# Patient Record
Sex: FEMALE | Race: WHITE
[De-identification: names, ages, dates, MRNs, and addresses within clinical notes are randomized per-mention and may not be internally consistent; named-entity substitution may affect disease eponyms.]

---

## 2019-02-17 NOTE — EMERGENCY ROOM REPORT
History of Present Illness


General


Chief Complaint:  Headache


Source:  Patient, Medical Record





Present Illness


HPI


The patient is a 53-year-old female presenting for headache and dizziness.  She 

states that a bookshelf fell on her head 3 days prior.  She denies loss of 

consciousness at that time but states that the memory is "a little hazy".  She 

denies falling.  Pain is an 8/10 dull ache to the back of the head. Does not 

radiate. Tylenol mildly helps. The bookshelf fell from approximately 2 feet 

above her head.  She denies other symptoms including nausea, vomiting, blurred 

vision, neck pain, photophobia


Allergies:  


Coded Allergies:  


     MEPERIDINE (Verified  Allergy, Intermediate, Hives, 2/25/13)


 COPIED FROM UNCODED SECTION


     PANTOPRAZOLE (Verified  Allergy, Intermediate, 7/16/17)


     MORPHINE (Verified  Allergy, Unknown, ITCHING, 7/16/17)


     TRAMADOL (Verified  Allergy, Unknown, 2/17/19)


     METOCLOPRAMIDE (Verified  Adverse Reaction, Intermediate, "MY MUSCLE 

MUSCLE ARE FREEZING UP", 2/25/13)


 COPIED FROM UNCODED SECTION





Patient History


Past Medical History:  see triage record


Pertinent Family History:  none


Last Menstrual Period:  menopause


Reviewed Nursing Documentation:  PMH: Agreed; PSxH: Agreed





Nursing Documentation-PMH


Past Medical History:  No History, Except For


Hx Cardiac Problems:  No


Hx Hypertension:  No


Hx Asthma:  No


Hx COPD:  No


Hx Diabetes:  No


Hx Cancer:  No


Hx Gastrointestinal Problems:  Yes - ulcer, gastrectomy


Hx Dialysis:  No


Hx Neurological Problems:  No


Hx Cerebrovascular Accident:  No


Hx Transient Ischemic Attacks:  No


Hx Dementia:  No


Hx Alzheimer's Disease:  No


Hx Parkinson's Disease:  No


Hx Meningitis:  No


Hx Encephalitis:  No


Hx Seizures:  No


Hx Epilepsy:  No


Hx Multiple Sclerosis:  No


Hx Cerebral Palsy:  No


Hx Amyotrophic Lat Sclerosis:  No


Hx Guillian-[place] Syndrome:  No


Hx Paralysis:  No


Hx Peripheral Neuropathy:  No


Hx Spinal Cord Injury:  No


Hx Head Trauma:  No


Hx Traumatic Brain Injury:  No


Hx Memory Loss:  No


Hx Concentration Difficulty:  No


Hx Speech Problem:  No


Hx Tremors:  No


Hx Vertigo:  No


Hx Dizziness:  No


Hx Syncope:  No


Hx Headaches:  Yes - POST SUBDURAL HEMATOMA REMOVAL


Hx Aphasia:  No


Hx Dysphasia:  No


Hx Numbness:  No


Hx Weakness:  No


Hx Fatigue:  No


Hx Neurologic Surgery:  Yes - HAD SUBDURAL HEMATOMA REMOVED


Hx Brain Shunt:  No





Review of Systems


All Other Systems:  negative except mentioned in HPI





Physical Exam





Vital Signs








  Date Time  Temp Pulse Resp B/P (MAP) Pulse Ox O2 Delivery O2 Flow Rate FiO2


 


2/17/19 17:50 97.7 87 18 122/77 99 Room Air  








Sp02 EP Interpretation:  reviewed, normal


General Appearance:  no apparent distress, alert, GCS 15, non-toxic


Head:  normocephalic, atraumatic, other - TTP over the R posterior region


Eyes:  bilateral eye normal inspection, bilateral eye PERRL, bilateral eye EOMI


ENT:  hearing grossly normal, normal pharynx, no angioedema, normal voice


Neck:  full range of motion, no bony tend, supple/symm/no masses


Musculoskeletal:  back normal, gait/station normal, normal range of motion, non-

tender


Neurologic:  alert, oriented x3, responsive, motor strength/tone normal, 

sensory intact, speech normal


Psychiatric:  judgement/insight normal, memory normal, mood/affect normal, no 

suicidal/homicidal ideation


Skin:  normal color, no rash, warm/dry, well hydrated





Medical Decision Making


PA Attestation


Dr. Pepe  is my supervising physician. Patient management was discussed with 

my supervising physician


Diagnostic Impression:  


 Primary Impression:  


 Concussion


 Qualified Codes:  S06.0X0A - Concussion without loss of consciousness, initial 

encounter


ER Course


The patient is a 53-year-old female presenting for headache and dizziness.





Differential diagnoses considered but not limited to: Concussion, contusion, 

intracranial hemorrhage, fracture, among others





PE: Vitals stable. NAD


Head is normocephalic atraumatic.  There is tenderness to palpation over the 

right posterior occipital region.  No crepitus or depressions. 


PERRL. EOMI





Pt will be treated symptomatically. She is given information regarding 

concussions and given strict ER precautions. 





She is told to F/U with PCP ASAP.





Last Vital Signs








  Date Time  Temp Pulse Resp B/P (MAP) Pulse Ox O2 Delivery O2 Flow Rate FiO2


 


2/17/19 18:39 98.0 88 15 126/70 100 Room Air  








Status:  improved


Disposition:  HOME, SELF-CARE


Condition:  Improved


Scripts


Ondansetron* (ZOFRAN*) 4 Mg Tablet


4 MG ORAL Q6H PRN for Nausea & Vomiting, #15 TAB


   Prov: NADEEN LOREDO P.A.         2/17/19 


Hydrocodone Bit/Acetaminophen 5-325* (NORCO 5-325*) 1 Each Tablet


1 TAB ORAL Q6H PRN for For Pain, #10 TAB 0 Refills


   Prov: NADEEN LOREDO         2/17/19


Referrals:  


NON PHYSICIAN (PCP)


Patient Instructions:  Head Injury, Adult, Concussion, Adult





Additional Instructions:  


I discussed my findings with the patient. All questions and concerns have been 

answered. Treatment and medication compliance have been addressed. I advised 

the patient that they need to follow up with primary doctor as soon as 

possible. Return to ER if symptoms worsen, new symptoms arise, headache 

persists or worsens after taking medication, or if needed for any reason. 

Patient verbalized understanding of discharge instructions.











NADEEN LOREDO Feb 17, 2019 20:45

## 2019-02-17 NOTE — NUR
ED Nurse Note:



Pt states she already took tylenol 1000mg an hour ago. Bryan notified and DC 
tylenol order.

## 2019-02-17 NOTE — NUR
ED Nurse Note:



Pt was seen due to headache. Pt is cleared by Health Care Provider for 
discharge. DC instructions/prescription was given and explained to pt and 
verbalized understanding of teachings given. All medical devices such as ID 
band removed. Pt AAO x4, ambulatory and left with all personal belongings. Yes
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
## 2019-06-21 NOTE — EMERGENCY ROOM REPORT
History of Present Illness


General


Chief Complaint:  Eye Problems


Source:  Patient, Medical Record





Present Illness


HPI


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  Patient states that yesterday she 

had gotten some dog hair in her right eye.  She states she did irrigate her eye 

at that time.  She states that it was bothering her and then this morning when 

she woke up her eyes irritated and crusty with discharge.  She states that she 

is unable to keep the eye open secondary to discomfort.


Allergies:  


Coded Allergies:  


     MEPERIDINE (Verified  Allergy, Intermediate, Hives, 2/25/13)


 COPIED FROM UNCODED SECTION


     PANTOPRAZOLE (Verified  Allergy, Intermediate, 7/16/17)


     MORPHINE (Verified  Allergy, Unknown, ITCHING, 7/16/17)


     TRAMADOL (Verified  Allergy, Unknown, 2/17/19)


     METOCLOPRAMIDE (Verified  Adverse Reaction, Intermediate, "MY MUSCLE 

MUSCLE ARE FREEZING UP", 2/25/13)


 COPIED FROM UNCODED SECTION





Patient History


Past Medical History:  see triage record, ulcer, GERD


Past Surgical History:  other - Partial gastrectomy, Subdural hematoma


Social History:  Reports: smoking; Denies: alcohol use, drug use


Last Menstrual Period:  menopause


Reviewed Nursing Documentation:  PMH: Agreed; PSxH: Agreed





Nursing Documentation-PMH


Past Medical History:  No History, Except For


Hx Cardiac Problems:  No - SUBDURAL HEMATOMA FEB 2010


Hx Hypertension:  No


Hx Asthma:  No


Hx COPD:  No


Hx Diabetes:  No


Hx Cancer:  No


Hx Gastrointestinal Problems:  Yes - PUD, gastrectomy


Hx Dialysis:  No


Hx Neurological Problems:  No


Hx Cerebrovascular Accident:  No


Hx Transient Ischemic Attacks:  No


Hx Dementia:  No


Hx Alzheimer's Disease:  No


Hx Parkinson's Disease:  No


Hx Meningitis:  No


Hx Encephalitis:  No


Hx Seizures:  No


Hx Epilepsy:  No


Hx Multiple Sclerosis:  No


Hx Cerebral Palsy:  No


Hx Amyotrophic Lat Sclerosis:  No


Hx Guillian-[place] Syndrome:  No


Hx Paralysis:  No


Hx Peripheral Neuropathy:  No


Hx Spinal Cord Injury:  No


Hx Head Trauma:  No


Hx Traumatic Brain Injury:  No


Hx Memory Loss:  No


Hx Concentration Difficulty:  No


Hx Speech Problem:  No


Hx Tremors:  No


Hx Vertigo:  No


Hx Dizziness:  No


Hx Syncope:  No


Hx Headaches:  Yes - POST SUBDURAL HEMATOMA REMOVAL


Hx Aphasia:  No


Hx Dysphasia:  No


Hx Numbness:  No


Hx Weakness:  No


Hx Fatigue:  No


Hx Neurologic Surgery:  Yes - HAD SUBDURAL HEMATOMA REMOVED


Hx Brain Shunt:  No





Review of Systems


All Other Systems:  negative except mentioned in HPI





Physical Exam





Vital Signs








  Date Time  Temp Pulse Resp B/P (MAP) Pulse Ox O2 Delivery O2 Flow Rate FiO2


 


6/21/19 11:35 98.1 76 16 98/60 (73) 97 Room Air  








Sp02 EP Interpretation:  reviewed, normal


General Appearance:  no apparent distress, alert, GCS 15, non-toxic


Head:  normocephalic, atraumatic


Eyes:  right eye other - R. eye with a thin hair in conjunctiva.  No fluorescin 

uptake on woods lamp.  ; bilateral eye PERRL, bilateral eye EOMI


ENT:  hearing grossly normal, normal pharynx, no angioedema, normal voice


Neck:  full range of motion, supple/symm/no masses


Respiratory:  no respiratory distress, no retraction, no accessory muscle use, 

speaking full sentences


Rectal:  deferred


Musculoskeletal:  back normal, gait/station normal, normal range of motion, non-

tender


Neurologic:  alert, oriented x3, responsive, motor strength/tone normal, 

sensory intact, speech normal


Psychiatric:  judgement/insight normal, memory normal, mood/affect normal, no 

suicidal/homicidal ideation


Skin:  normal color, no rash, warm/dry, well hydrated





Medical Decision Making


Diagnostic Impression:  


 Primary Impression:  


 Conjunctivitis


 Additional Impression:  


 Corneal abrasion


ER Course


The patient possibly had a small piece of animal hair in her eye.  There was no 

evidence of corneal abrasion.  The patient was very sensitive to light and 

overall was resistant to opening either eye.  I did not identify any 

significant corneal abrasion.  I did sweep the lids.  The patient was keeping 

her eyelid closed.  I am unsure if this was actually secondary to true eye pain 

or anxiety.  Regardless, the patient was instructed to go to an eye specialist 

right now.  I will give the patient topical antibiotics.  I did impress upon 

her the importance of ophthalmology or optometry evaluation today.  She 

indicated understanding and intention to do so.





Last Vital Signs








  Date Time  Temp Pulse Resp B/P (MAP) Pulse Ox O2 Delivery O2 Flow Rate FiO2


 


6/21/19 11:51 98.1 72 15 108/62 99 Room Air  








Status:  improved


Disposition:  HOME, SELF-CARE


Condition:  Improved


Referrals:  


NOT CHOSEN IPA/MD,REFERRING (PCP)


Patient Instructions:  Bacterial Conjunctivitis, Easy-to-Read











Jennifer Coats DO Jun 21, 2019 12:39

## 2019-06-21 NOTE — NUR
ED Nurse Note:



Patient walked into ED  c/o pain in the right eye. patient works as a 
 and reports that animal hair got into her right eye 
yesterday while working.

patient reports burning pain 8/10. patient reports she flushed with NS really 
well, 

but now it feels like it's "infected."

## 2019-07-13 NOTE — EMERGENCY ROOM REPORT
History of Present Illness


General


Chief Complaint:  Vomiting


Source:  Patient





Present Illness


HPI


53-year-old female past history of gastric ulcer, requiring resection due to 

uncontrolled bleeding, patient patient presents with one episode of hematemesis 

one cupful per patient, she endorses nausea, no chest pain, no shortness of 

breath, she denies any aggravating or alleviating factors, she does endorse 

some achy epigastric pain, no aggravating or alleviating factors patient 

presents for evaluation, she denies any lightheadedness,


Allergies:  


Coded Allergies:  


     MEPERIDINE (Verified  Allergy, Intermediate, Hives, 2/25/13)


 COPIED FROM UNCODED SECTION


     PANTOPRAZOLE (Verified  Allergy, Intermediate, 7/16/17)


     MORPHINE (Verified  Allergy, Unknown, ITCHING, 7/16/17)


     TRAMADOL (Verified  Allergy, Unknown, 2/17/19)


     METOCLOPRAMIDE (Verified  Adverse Reaction, Intermediate, "MY MUSCLE 

MUSCLE ARE FREEZING UP", 2/25/13)


 COPIED FROM UNCODED SECTION





Patient History


Past Medical History:  see triage record


Social History:  Reports: smoking


Reviewed Nursing Documentation:  PMH: Agreed; PSxH: Agreed





Nursing Documentation-PMH


Past Medical History:  No History, Except For


Hx Cardiac Problems:  No - SUBDURAL HEMATOMA FEB 2010


Hx Hypertension:  No


Hx Asthma:  No


Hx COPD:  No


Hx Diabetes:  No


Hx Cancer:  No


Hx Gastrointestinal Problems:  Yes - PUD, gastrectomy


Hx Dialysis:  No


Hx Neurological Problems:  No


Hx Cerebrovascular Accident:  No


Hx Transient Ischemic Attacks:  No


Hx Dementia:  No


Hx Alzheimer's Disease:  No


Hx Parkinson's Disease:  No


Hx Meningitis:  No


Hx Encephalitis:  No


Hx Seizures:  No


Hx Epilepsy:  No


Hx Multiple Sclerosis:  No


Hx Cerebral Palsy:  No


Hx Amyotrophic Lat Sclerosis:  No


Hx Guillian-[place] Syndrome:  No


Hx Paralysis:  No


Hx Peripheral Neuropathy:  No


Hx Spinal Cord Injury:  No


Hx Head Trauma:  No


Hx Traumatic Brain Injury:  No


Hx Memory Loss:  No


Hx Concentration Difficulty:  No


Hx Speech Problem:  No


Hx Tremors:  No


Hx Vertigo:  No


Hx Dizziness:  No


Hx Syncope:  No


Hx Headaches:  Yes - POST SUBDURAL HEMATOMA REMOVAL


Hx Aphasia:  No


Hx Dysphasia:  No


Hx Numbness:  No


Hx Weakness:  No


Hx Fatigue:  No


Hx Neurologic Surgery:  Yes - HAD SUBDURAL HEMATOMA REMOVED


Hx Brain Shunt:  No





Review of Systems


Constitutional:  Denies: chills, fever


Eye:  Denies: blurred vision, double vision


ENT:  Denies: throat pain, nasal discharge


Respiratory:  Denies: cough, shortness of breath


Cardiovascular:  Denies: chest pain, palpitations


Gastrointestinal:  Reports: abdominal pain, nausea, vomiting; Denies: diarrhea


Genitourinary:  Denies: pain


Musculoskeletal:  Denies: back pain, muscle pain


Skin:  Denies: rash, lesions


Neurological:  Denies: headache, focal weakness


Hematologic/Lymphatic:  Denies: easy bleeding, easy bruising


All Other Systems:  negative except mentioned in HPI





Physical Exam





Vital Signs








  Date Time  Temp Pulse Resp B/P (MAP) Pulse Ox O2 Delivery O2 Flow Rate FiO2


 


7/13/19 18:47 98.4 90 18 112/77 (89) 98 Room Air  








Sp02 EP Interpretation:  reviewed, normal


General Appearance:  well appearing, no apparent distress, alert


Head:  normocephalic, atraumatic


Eyes:  bilateral eye PERRL, bilateral eye EOMI


ENT:  uvula midline, moist mucus membranes


Neck:  supple, thyroid normal, supple/symm/no masses


Respiratory:  lungs clear, no respiratory distress, no retraction, no accessory 

muscle use


Cardiovascular #1:  normal peripheral pulses, regular rate, rhythm, no edema, 

no gallop, no murmur


Gastrointestinal:  soft, no guarding, no rebound, tenderness - Tenderness to 

palpation epigastrically, other - Midline scar noted


Musculoskeletal:  normal inspection


Neurologic:  alert, oriented x3


Psychiatric:  mood/affect normal


Skin:  no rash, warm/dry





Medical Decision Making


Diagnostic Impression:  


 Primary Impression:  


 Hematemesis


ER Course


53-year-old female presents with epigastric pain nausea vomiting, hematemesis 

x1 episode, no vomiting in the ER, patient has remained hemodynamic Dayday stable, 

multiple re-evaluations, patient's pain was well controlled with Dilaudid, 

patient was requesting Dilaudid, CT abdomen and pelvis shows no acute findings, 

patient was to be transferred to [facility], Dr. Guerra accepted at 

10:05pm.  Patient was counseled and she stated that she does not want to go to 

[facility] and wants to leave AGAINST MEDICAL ADVICE





DDX: Bleeding ulcer, gastritis, hematemesis





The patient has requested to leave the ED against medical advice. The patient 

reason(s) for leaving include, but are not limited to, the following: does not 

want to be transferred to [facility]. I believe this patient is of sound mind 

and competent to refuse medical care. The patient is responding and asking 

questions appropriately. The patient is oriented to person, place and time. The 

patient is not psychotic, delusional, suicidal, homicidal or hallucinating. The 

patient demonstrates a normal mental capacity to make decisions regarding their 

healthcare. The patient is clinically sober and does not appear to be under the 

influence of any illicit drugs at this time. The patient has been advised of 

the risks, in layman terms, of leaving AMA which include, but are not limited 

to death, coma, permanent disability, loss of current lifestyle, delay in 

diagnosis. Alternatives have been offered - the patient remains steadfast in 

their wish to leave. The patient has been advised that should they change their 

mind they are welcome to return to this hospital, or any other, at any time. 

The patient understands that in no way does an AMA discharge mean that I do not 

want them to have the best medical care available. To this end, I have provided 

appropriate prescriptions, referrals, and discharge instructions. The patient 

did sign AMA paperwork. The above discussion was witnessed by another member of 

staff.





Laboratory Tests








Test


  7/13/19


19:30 7/13/19


20:19


 


White Blood Count


  4.4 K/UL


(4.8-10.8)  L 


 


 


Red Blood Count


  3.90 M/UL


(4.20-5.40)  L 


 


 


Hemoglobin


  11.6 G/DL


(12.0-16.0)  L 


 


 


Hematocrit


  36.3 %


(37.0-47.0)  L 


 


 


Mean Corpuscular Volume 93 FL (80-99)   


 


Mean Corpuscular Hemoglobin


  29.9 PG


(27.0-31.0) 


 


 


Mean Corpuscular Hemoglobin


Concent 32.1 G/DL


(32.0-36.0) 


 


 


Red Cell Distribution Width


  12.2 %


(11.6-14.8) 


 


 


Platelet Count


  166 K/UL


(150-450) 


 


 


Mean Platelet Volume


  7.7 FL


(6.5-10.1) 


 


 


Neutrophils (%) (Auto)


  51.7 %


(45.0-75.0) 


 


 


Lymphocytes (%) (Auto)


  37.5 %


(20.0-45.0) 


 


 


Monocytes (%) (Auto)


  5.2 %


(1.0-10.0) 


 


 


Eosinophils (%) (Auto)


  3.8 %


(0.0-3.0)  H 


 


 


Basophils (%) (Auto)


  1.9 %


(0.0-2.0) 


 


 


Prothrombin Time


  10.2 SEC


(9.30-11.50) 


 


 


Prothrombin Time INR 1.0 (0.9-1.1)   


 


PTT


  25 SEC (23-33)


  


 


 


Sodium Level


  143 MMOL/L


(136-145) 


 


 


Potassium Level


  3.8 MMOL/L


(3.5-5.1) 


 


 


Chloride Level


  105 MMOL/L


() 


 


 


Carbon Dioxide Level


  27 MMOL/L


(21-32) 


 


 


Anion Gap


  11 mmol/L


(5-15) 


 


 


Blood Urea Nitrogen


  18 mg/dL


(7-18) 


 


 


Creatinine


  0.7 MG/DL


(0.55-1.30) 


 


 


Estimate Glomerular


Filtration Rate > 60 mL/min


(>60) 


 


 


Glucose Level


  95 MG/DL


() 


 


 


Calcium Level


  9.4 MG/DL


(8.5-10.1) 


 


 


Total Bilirubin


  0.2 MG/DL


(0.2-1.0) 


 


 


Aspartate Amino Transferase


(AST) 33 U/L (15-37)


  


 


 


Alanine Aminotransferase (ALT)


  32 U/L (12-78)


  


 


 


Alkaline Phosphatase


  75 U/L


() 


 


 


Troponin I


  0.004 ng/mL


(0.000-0.056) 


 


 


Total Protein


  7.4 G/DL


(6.4-8.2) 


 


 


Albumin


  4.7 G/DL


(3.4-5.0) 


 


 


Globulin 2.7 g/dL   


 


Albumin/Globulin Ratio 1.7 (1.0-2.7)   


 


Lipase


  94 U/L


() 


 


 


Urine Color  Yellow  


 


Urine Appearance  Clear  


 


Urine pH  6 (4.5-8.0)  


 


Urine Specific Gravity


  


  1.015


(1.005-1.035)


 


Urine Protein


  


  Negative


(NEGATIVE)


 


Urine Glucose (UA)


  


  Negative


(NEGATIVE)


 


Urine Ketones


  


  Negative


(NEGATIVE)


 


Urine Blood


  


  Negative


(NEGATIVE)


 


Urine Nitrite


  


  Negative


(NEGATIVE)


 


Urine Bilirubin


  


  Negative


(NEGATIVE)


 


Urine Urobilinogen


  


  1 MG/DL


(0.0-1.0)  H


 


Urine Leukocyte Esterase


  


  1+ (NEGATIVE)


H


 


Urine RBC


  


  0 /HPF (0 - 2)


 


 


Urine WBC


  


  0-2 /HPF (0 -


2)


 


Urine Squamous Epithelial


Cells 


  Few /LPF


(NONE/OCC)


 


Urine Bacteria


  


  Few /HPF


(NONE)








Lab Results Impression


H/H stable, unchanged from previous


EKG Diagnostic Results


EKG Time:  19:16


EP Interpretation:  Normal sinus rhythm, rate 81, QTc 47, no acute ST elevations

, normal axis


Rate:  normal


Rhythm:  NSR


ST Segments:  no acute changes





Chest X-Ray Diagnostic Results


Chest X-Ray Diagnostic Results :  


   Chest X-Ray Ordered:  Yes


   # of Views/Limited/Complete:  1 View


   Indication:  Other - Hematemesis


   EP Interpretation:  Yes


   Interpretation:  no consolidation, no effusion, no pneumothorax, no acute 

cardiopulmonary disease


   Impression:  No acute disease





CT/MRI/US Diagnostic Results


CT/MRI/US Diagnostic Results :  


   Imaging Test Ordered:  CT abdomen and pelvis with contrast


   Impression


No acute intra-abdominal processes per radiology, gastric sleeve noted





Last Vital Signs








  Date Time  Temp Pulse Resp B/P (MAP) Pulse Ox O2 Delivery O2 Flow Rate FiO2


 


7/13/19 18:47 98.4 90 18 112/77 (89) 98 Room Air  








Disposition:  AGAINST MEDICAL ADVICE


Condition:  Improved


Scripts


Famotidine (PEPCID AC) 20 Mg Tablet


20 MG PO DAILY, #30 TAB


   Prov: Tono Jolley M.D.         7/13/19


Referrals:  


[facility]


Patient Instructions:  Nausea and Vomiting, Adult





Additional Instructions:  


The patient was provided with discharge instructions, notified to follow-up 

with a primary care doctor and or specialist in the next 24-48 hours, and to 

return to the ED if they have worsening of their symptoms. 








Please follow-up with gastroenterology in 24 to 48 hours,





Please note that this report is being documented using DRAGON technology.


This can lead to erroneous entry secondary to incorrect interpretation by the 

dictating instrument.











Tono Jolley M.D. Jul 13, 2019 19:28

## 2019-07-13 NOTE — NUR
ER DISCHARGE NOTE:

Patient was discharged per LUANA NOBLE aware and spoke with pt in regards to 
risks. pt refuses to transferred to another hospital. pt is aox4, on room air, 
with stable vital signs. pt was given dc and prescription instructions, pt was 
able to verbalize understanding, pt id band and iv site removed without 
complications. pt is able to ambulate with steady gait. pt took all belongings.

## 2019-07-14 NOTE — DIAGNOSTIC IMAGING REPORT
Indication: Abdominal pain

 

Technique: Continuous helical transaxial imaging of the abdomen and pelvis was

obtained from the lung bases to the pubic symphysis during intravenous contrast

administration. Coronal 2-D reformats were also obtained. Study obtained in a Siemens

sensation 64 slice CT.  Automatic Exposure Control was utilized.

 

Total Dose length Product (DLP):  543.98 mGycm

 

CT Dose Index Volume (CTDIvol):   11.9 mGy

 

Comparison: CT 7/16/2017

 

Findings: Lung bases are clear. Signs of the gastric bypass are noted.

Cholecystectomy also demonstrated. The biliary ducts are prominent. Correlate

clinically. Appearance was similar on the prior occasion. Both kidneys enhance

normally. There is a moderate amount of fecal retention in the colon. Bowel gas

pattern appears nonobstructive. There is no significant free fluid. There is no

hydronephrosis. The spleen and pancreas, adrenal glands appear unremarkable. The

uterus is noted. Adnexal structures not seen well.

 

IMPRESSION:

 

No acute findings identified

 

Status post gastrectomy

 

Biliary ductal prominence, stable compared to last study may be related to prior

cholecystectomy. Correlate clinically.

 

[facility] has communicated the preliminary results to the Emergency

Department.  Their findings are largely concordant with this report.

 

 

 

The CT scanner at [facility] is accredited by the American College of

Radiology and the scans are performed using dose optimization techniques as

appropriate to a performed exam including Automatic Exposure control.

## 2019-08-07 NOTE — NUR
ED Nurse Note:

c/o vomiting blood x 2 day (bright red blood with clots '1/2 cup') and c/o LUQ 
abdominal pain since this morning.Reports no use of alcohol or taking any blood 
thinner. ao4 nad vss

## 2019-08-07 NOTE — NUR
ED Nurse Note:

transfer to [facility] cancelled. report given to bridgett barker of [facility] er department. patient to be under the care of md montserrat. pt aware of 
peding transfer.

## 2019-08-07 NOTE — NUR
ED Nurse Note:

REPORT GIVEN TO RACHEL WALKER OF [facility]. PATIENT TO BE ADMITTED 
TO TELE UNDER MD SUNNY.

## 2019-08-07 NOTE — EMERGENCY ROOM REPORT
History of Present Illness


General


Chief Complaint:  Gastrointestinal Bleed


Source:  Patient





Present Illness


HPI


Patient has a history of upper GI bleeding.  Patient has had stomach surgery.  

Patient states that she developed acute onset of vomiting hematemesis today.  

She states that she vomited approximately 2 cupfuls with a blood.  Of note 

patient was here just recently with similar symptoms.  At that time also had a 

CT scan which was negative.  She denies any fever.  Denies any melena.  Denies 

any chest pain shortness of breath.  Symptoms noted to be severe.  Patient 

states the last time she did not want to be transferred to an outside facility 

so she left AGAINST MEDICAL ADVICE but this time she is willing to be treated 

appropriately including transfer.  Symptoms noted to be severe.  No other 

modifying factors.  No other associated signs and symptoms.  No other 

complaints were noted.


Allergies:  


Coded Allergies:  


     MEPERIDINE (Verified  Allergy, Intermediate, Hives, 2/25/13)


 COPIED FROM UNCODED SECTION


     PANTOPRAZOLE (Verified  Allergy, Intermediate, 7/16/17)


     MORPHINE (Verified  Allergy, Unknown, ITCHING, 7/16/17)


     TRAMADOL (Verified  Allergy, Unknown, 2/17/19)


     METOCLOPRAMIDE (Verified  Adverse Reaction, Intermediate, "MY MUSCLE 

MUSCLE ARE FREEZING UP", 2/25/13)


 COPIED FROM UNCODED SECTION





Patient History


Past Medical History:  other - Peptic ulcer disease, gastrectomy


Past Surgical History:  none


Pertinent Family History:  none


Social History:  Denies: smoking, alcohol use, drug use


Last Menstrual Period:  menopause


Reviewed Nursing Documentation:  PMH: Agreed; PSxH: Agreed





Nursing Documentation-PMH


Past Medical History:  No History, Except For


Hx Cardiac Problems:  No - SUBDURAL HEMATOMA FEB 2010


Hx Hypertension:  No


Hx Pacemaker:  No


Hx Asthma:  No


Hx COPD:  No


Hx Diabetes:  No


Hx Cancer:  No


Hx Gastrointestinal Problems:  Yes - PUD, gastrectomy


Hx Dialysis:  No


Hx Neurological Problems:  No


Hx Cerebrovascular Accident:  No


Hx Transient Ischemic Attacks:  No


Hx Dementia:  No


Hx Alzheimer's Disease:  No


Hx Parkinson's Disease:  No


Hx Meningitis:  No


Hx Encephalitis:  No


Hx Seizures:  No


Hx Epilepsy:  No


Hx Multiple Sclerosis:  No


Hx Cerebral Palsy:  No


Hx Amyotrophic Lat Sclerosis:  No


Hx Guillian-[place] Syndrome:  No


Hx Paralysis:  No


Hx Peripheral Neuropathy:  No


Hx Spinal Cord Injury:  No


Hx Head Trauma:  No


Hx Traumatic Brain Injury:  No


Hx Memory Loss:  No


Hx Concentration Difficulty:  No


Hx Speech Problem:  No


Hx Tremors:  No


Hx Vertigo:  No


Hx Dizziness:  No


Hx Syncope:  No


Hx Headaches:  Yes - POST SUBDURAL HEMATOMA REMOVAL


Hx Aphasia:  No


Hx Dysphasia:  No


Hx Numbness:  No


Hx Weakness:  No


Hx Fatigue:  No


Hx Neurologic Surgery:  Yes - HAD SUBDURAL HEMATOMA REMOVED


Hx Brain Shunt:  No





Review of Systems


All Other Systems:  negative except mentioned in HPI





Physical Exam





Vital Signs








  Date Time  Temp Pulse Resp B/P (MAP) Pulse Ox O2 Delivery O2 Flow Rate FiO2


 


8/7/19 18:45 98.4 89 19 116/83 (94) 95 Room Air  








Sp02 EP Interpretation:  reviewed, normal


General Appearance:  normal inspection, well appearing, no apparent distress, 

alert


Head:  atraumatic


Eyes:  bilateral eye normal inspection


ENT:  normal ENT inspection, hearing grossly normal, normal voice


Neck:  normal inspection, full range of motion, supple, no bony tend


Respiratory:  normal inspection, lungs clear, normal breath sounds, no 

respiratory distress, no retraction, no wheezing


Cardiovascular #1:  regular rate, rhythm, no edema


Gastrointestinal:  normal inspection, normal bowel sounds, soft, no guarding, 

no hernia, tenderness - Epigastrium


Genitourinary:  no CVA tenderness


Musculoskeletal:  normal inspection, back normal, normal range of motion


Neurologic:  normal inspection, alert, responsive, speech normal


Psychiatric:  normal inspection, judgement/insight normal, mood/affect normal





Medical Decision Making


Diagnostic Impression:  


 Primary Impression:  


 GIB (gastrointestinal bleeding)


 Additional Impression:  


 Abdominal pain


ER Course


Patient presents emergency department today complaining of abdominal pain.  

Patient has a history of gastric surgery x2 to treat gastric ulcer.  

Unfortunately she continues have abdominal discomfort and she had several 

episodes of GI bleeding in the past.  Today she had 2 episodes she says of 

vomiting hematemesis.  She states that the amount of blood loss probably was a 

couple cups full.  She denies any melena.  Symptoms noted to be severe.  

Differential diagnosis include active GI bleeding, esophageal varices, stomach 

ulcer just name few.  Given the severity of the patient's presentation I felt 

this is a highly complex patient.  This patient required extensive workup.


Patient laboratory work-up was not impressive.  Patient was given pain 

medications to control pain.  Case was discussed with Dr. Calloway.  Patient will 

be transferred to [facility] for further management.





Labs








Test


  8/7/19


19:30


 


White Blood Count


  5.6 K/UL


(4.8-10.8)


 


Red Blood Count


  4.37 M/UL


(4.20-5.40)


 


Hemoglobin


  12.9 G/DL


(12.0-16.0)


 


Hematocrit


  38.4 %


(37.0-47.0)


 


Mean Corpuscular Volume 88 FL (80-99) 


 


Mean Corpuscular Hemoglobin


  29.6 PG


(27.0-31.0)


 


Mean Corpuscular Hemoglobin


Concent 33.7 G/DL


(32.0-36.0)


 


Red Cell Distribution Width


  11.9 %


(11.6-14.8)


 


Platelet Count


  151 K/UL


(150-450)


 


Mean Platelet Volume


  7.2 FL


(6.5-10.1)


 


Neutrophils (%) (Auto)


  42.3 %


(45.0-75.0)


 


Lymphocytes (%) (Auto)


  45.1 %


(20.0-45.0)


 


Monocytes (%) (Auto)


  5.7 %


(1.0-10.0)


 


Eosinophils (%) (Auto)


  6.4 %


(0.0-3.0)


 


Basophils (%) (Auto)


  0.6 %


(0.0-2.0)


 


Prothrombin Time


  10.8 SEC


(9.30-11.50)


 


Prothromb Time International


Ratio 1.0 (0.9-1.1) 


 


 


Activated Partial


Thromboplast Time 27 SEC (23-33) 


 


 


Sodium Level


  140 MMOL/L


(136-145)


 


Potassium Level


  4.6 MMOL/L


(3.5-5.1)


 


Chloride Level


  105 MMOL/L


()


 


Carbon Dioxide Level


  26 MMOL/L


(21-32)


 


Anion Gap


  9 mmol/L


(5-15)


 


Blood Urea Nitrogen


  22 mg/dL


(7-18)


 


Creatinine


  0.6 MG/DL


(0.55-1.30)


 


Estimat Glomerular Filtration


Rate > 60 mL/min


(>60)


 


Glucose Level


  95 MG/DL


()


 


Calcium Level


  9.8 MG/DL


(8.5-10.1)


 


Total Bilirubin


  0.3 MG/DL


(0.2-1.0)


 


Aspartate Amino Transf


(AST/SGOT) 27 U/L (15-37) 


 


 


Alanine Aminotransferase


(ALT/SGPT) 26 U/L (12-78) 


 


 


Alkaline Phosphatase


  75 U/L


()


 


Total Protein


  7.7 G/DL


(6.4-8.2)


 


Albumin


  4.4 G/DL


(3.4-5.0)


 


Globulin 3.3 g/dL 


 


Albumin/Globulin Ratio 1.3 (1.0-2.7) 


 


Lipase


  94 U/L


()











Last Vital Signs








  Date Time  Temp Pulse Resp B/P (MAP) Pulse Ox O2 Delivery O2 Flow Rate FiO2


 


8/7/19 18:45 98.4 89 19 116/83 (94) 95 Room Air  








Status:  improved


Disposition:  XFER [facility]


Condition:  Serious











Rodrigo Infante MD Aug 7, 2019 19:00

## 2019-08-07 NOTE — NUR
ED Nurse Note:

REPORT GIVEN TO RACHEL YUSUF OF [facility]. PT LEFT WITH EMS WITH ALL 
BELONGINGS. STABLE FOR TRANSFER.

## 2019-08-22 NOTE — NUR
ED Nurse Note:

pt walked in c/o epigastric pain and blood in vomiting x 3 hrs.

-------------------------------------------------------------------------------

Addendum: 08/22/19 at 1940 by SERVANDO

-------------------------------------------------------------------------------

ED Nurse Note:

pt walked in c/o epigastric pain and blood in vomiting x 3 hrs. Pt is AAO X 4, 
VSS, with no acute distress.

## 2019-08-22 NOTE — EMERGENCY ROOM REPORT
History of Present Illness


General


Chief Complaint:  Abdominal Pain


Source:  Patient





Present Illness


HPI


53-year-old female past history of gastric ulcer, requiring resection due to 

uncontrolled bleeding, patient patient presents with one episode of hematemesis 

prior to arrival, one cupful, no aggravating or relieving factors, patient 

denies any lightheadedness no chest pain she does endorse some epigastric pain 

burning in nature severity is mild.  patient was seen at [facility] 8/8/ 2019 with a negative EGD.  Denies any chest pain shortness of breath.  Patient 

presents for evaluation


Allergies:  


Coded Allergies:  


     MEPERIDINE (Verified  Allergy, Intermediate, Hives, 2/25/13)


 COPIED FROM UNCODED SECTION


     PANTOPRAZOLE (Verified  Allergy, Intermediate, 7/16/17)


     MORPHINE (Verified  Allergy, Unknown, ITCHING, 7/16/17)


     TRAMADOL (Verified  Allergy, Unknown, 2/17/19)


     METOCLOPRAMIDE (Verified  Adverse Reaction, Intermediate, "MY MUSCLE 

MUSCLE ARE FREEZING UP", 2/25/13)


 COPIED FROM UNCODED SECTION





Patient History


Past Medical History:  see triage record


Pregnant Now:  No


Reviewed Nursing Documentation:  PMH: Agreed; PSxH: Agreed





Nursing Documentation-PMH


Past Medical History:  No History, Except For


Hx Cardiac Problems:  No - SUBDURAL HEMATOMA FEB 2010


Hx Hypertension:  No


Hx Pacemaker:  No


Hx Asthma:  No


Hx COPD:  No


Hx Diabetes:  No


Hx Cancer:  No


Hx Gastrointestinal Problems:  Yes - PUD, gastrectomy


Hx Dialysis:  No


Hx Neurological Problems:  No


Hx Cerebrovascular Accident:  No


Hx Transient Ischemic Attacks:  No


Hx Dementia:  No


Hx Alzheimer's Disease:  No


Hx Parkinson's Disease:  No


Hx Meningitis:  No


Hx Encephalitis:  No


Hx Seizures:  No


Hx Epilepsy:  No


Hx Multiple Sclerosis:  No


Hx Cerebral Palsy:  No


Hx Amyotrophic Lat Sclerosis:  No


Hx Guillian-Manderson Syndrome:  No


Hx Paralysis:  No


Hx Peripheral Neuropathy:  No


Hx Spinal Cord Injury:  No


Hx Head Trauma:  No


Hx Traumatic Brain Injury:  No


Hx Memory Loss:  No


Hx Concentration Difficulty:  No


Hx Speech Problem:  No


Hx Tremors:  No


Hx Vertigo:  No


Hx Dizziness:  No


Hx Syncope:  No


Hx Headaches:  Yes - POST SUBDURAL HEMATOMA REMOVAL


Hx Aphasia:  No


Hx Dysphasia:  No


Hx Numbness:  No


Hx Weakness:  No


Hx Fatigue:  No


Hx Neurologic Surgery:  Yes - HAD SUBDURAL HEMATOMA REMOVED


Hx Brain Shunt:  No





Review of Systems


All Other Systems:  negative except mentioned in HPI





Physical Exam





Vital Signs








  Date Time  Temp Pulse Resp B/P (MAP) Pulse Ox O2 Delivery O2 Flow Rate FiO2


 


8/22/19 19:26 97.7 103 18 112/75 (87) 98 Room Air  








Sp02 EP Interpretation:  reviewed, normal


General Appearance:  well appearing, no apparent distress, alert


Head:  normocephalic, atraumatic


Eyes:  bilateral eye PERRL, bilateral eye EOMI


ENT:  uvula midline, moist mucus membranes


Neck:  supple, thyroid normal, supple/symm/no masses


Respiratory:  lungs clear, no respiratory distress, no retraction, no accessory 

muscle use


Cardiovascular #1:  normal peripheral pulses, regular rate, rhythm, no edema, 

no gallop, no murmur


Gastrointestinal:  non tender, soft, no guarding, no rebound


Musculoskeletal:  normal inspection


Neurologic:  alert, oriented x3


Psychiatric:  mood/affect normal


Skin:  no rash, warm/dry





Medical Decision Making


Diagnostic Impression:  


 Primary Impression:  


 Gastritis


 Qualified Codes:  K29.51 - Unspecified chronic gastritis with bleeding


ER Course


53-year-old female history of gastric resection, history of GI bleeds, history 

of negative EGDs 2017, 2019 8/8/2019, patient has a history of asking for 

Dilaudid, patient in no acute distress, will provide patient with Pepcid, will 

evaluate hemoglobin, if there is no change in hemoglobin from her previous 

visit low suspicion for acute GI bleed.  She is in no acute distress


Reevaluation 8:16 PM, patient noted to be on her cell phone playing games, she 

told the nurse that she is allergic to morphine and requires stronger 

medication 


Spoke with Dr. Dileep Andrea 8:49pm it was found to have syringes of blood on her 

person, she was admitted 12 times to [facility] there is a concern for possible 

fictitious disorder


Reevaluation at 8:53 PM, patient wants to leave AGAINST MEDICAL ADVICE


H/H improved from [place] visit now 11.9 versus 10. 


Low suspicion for acute bleed. 


The patient has requested to leave the ED against medical advice. The patient 

reason(s) for leaving include, but are not limited to, the following: "my 

boyfriend wants me to leave". I believe this patient is of sound mind and 

competent to refuse medical care. The patient is responding and asking 

questions appropriately. The patient is oriented to person, place and time. The 

patient is not psychotic, delusional, suicidal, homicidal or hallucinating. The 

patient demonstrates a normal mental capacity to make decisions regarding their 

healthcare. The patient is clinically sober and does not appear to be under the 

influence of any illicit drugs at this time. The patient has been advised of 

the risks, in layman terms, of leaving AMA which include, but are not limited 

to death, coma, permanent disability, loss of current lifestyle, delay in 

diagnosis. Alternatives have been offered - the patient remains steadfast in 

their wish to leave. The patient has been advised that should they change their 

mind they are welcome to return to this hospital, or any other, at any time. 

The patient understands that in no way does an AMA discharge mean that I do not 

want them to have the best medical care available. To this end, I have provided 

appropriate prescriptions, referrals, and discharge instructions. The patient 

did sign AMA paperwork. The above discussion was witnessed by another member of 

staff.





Laboratory Tests








Test


  8/22/19


20:05 8/22/19


20:15


 


White Blood Count


  5.2 K/UL


(4.8-10.8) 


 


 


Red Blood Count


  4.07 M/UL


(4.20-5.40)  L 


 


 


Hemoglobin


  11.9 G/DL


(12.0-16.0)  L 


 


 


Hematocrit


  35.5 %


(37.0-47.0)  L 


 


 


Mean Corpuscular Volume 87 FL (80-99)   


 


Mean Corpuscular Hemoglobin


  29.3 PG


(27.0-31.0) 


 


 


Mean Corpuscular Hemoglobin


Concent 33.7 G/DL


(32.0-36.0) 


 


 


Red Cell Distribution Width


  12.4 %


(11.6-14.8) 


 


 


Platelet Count


  202 K/UL


(150-450) 


 


 


Mean Platelet Volume


  6.9 FL


(6.5-10.1) 


 


 


Neutrophils (%) (Auto)


  50.9 %


(45.0-75.0) 


 


 


Lymphocytes (%) (Auto)


  31.3 %


(20.0-45.0) 


 


 


Monocytes (%) (Auto)


  6.8 %


(1.0-10.0) 


 


 


Eosinophils (%) (Auto)


  9.2 %


(0.0-3.0)  H 


 


 


Basophils (%) (Auto)


  1.8 %


(0.0-2.0) 


 


 


Prothrombin Time Pending   


 


Prothrombin Time INR Pending   


 


PTT Pending   


 


Sodium Level


  142 MMOL/L


(136-145) 


 


 


Potassium Level


  3.6 MMOL/L


(3.5-5.1) 


 


 


Chloride Level


  107 MMOL/L


() 


 


 


Carbon Dioxide Level


  26 MMOL/L


(21-32) 


 


 


Anion Gap


  9 mmol/L


(5-15) 


 


 


Blood Urea Nitrogen


  15 mg/dL


(7-18) 


 


 


Creatinine


  0.8 MG/DL


(0.55-1.30) 


 


 


Estimate Glomerular


Filtration Rate > 60 mL/min


(>60) 


 


 


Glucose Level


  116 MG/DL


()  H 


 


 


Calcium Level


  9.2 MG/DL


(8.5-10.1) 


 


 


Total Bilirubin


  0.3 MG/DL


(0.2-1.0) 


 


 


Aspartate Amino Transferase


(AST) 28 U/L (15-37)


  


 


 


Alanine Aminotransferase (ALT)


  30 U/L (12-78)


  


 


 


Alkaline Phosphatase


  80 U/L


() 


 


 


Total Protein


  7.4 G/DL


(6.4-8.2) 


 


 


Albumin


  4.0 G/DL


(3.4-5.0) 


 


 


Globulin 3.4 g/dL   


 


Albumin/Globulin Ratio 1.2 (1.0-2.7)   


 


Lipase


  118 U/L


() 


 


 


Urine Color  Yellow  


 


Urine Appearance


  


  Slightly


cloudy


 


Urine pH  5 (4.5-8.0)  


 


Urine Specific Gravity


  


  1.025


(1.005-1.035)


 


Urine Protein


  


  Negative


(NEGATIVE)


 


Urine Glucose (UA)


  


  2+ (NEGATIVE)


H


 


Urine Ketones


  


  Negative


(NEGATIVE)


 


Urine Blood


  


  Negative


(NEGATIVE)


 


Urine Nitrite


  


  Negative


(NEGATIVE)


 


Urine Bilirubin


  


  Negative


(NEGATIVE)


 


Urine Urobilinogen


  


  4 MG/DL


(0.0-1.0)  H


 


Urine Leukocyte Esterase


  


  1+ (NEGATIVE)


H


 


Urine RBC  Pending  


 


Urine WBC  Pending  


 


Urine Squamous Epithelial


Cells 


  Pending  


 


 


Urine Bacteria  Pending  











Last Vital Signs








  Date Time  Temp Pulse Resp B/P (MAP) Pulse Ox O2 Delivery O2 Flow Rate FiO2


 


8/22/19 19:26 97.7 103 18 112/75 (87) 98 Room Air  








Disposition:  AGAINST MEDICAL ADVICE


Condition:  Stable


Referrals:  


GASTROENTEROLOGY


Patient Instructions:  Gastrointestinal Bleeding, Easy-to-Read





Additional Instructions:  


The patient was provided with discharge instructions, notified to follow-up 

with a primary care doctor and or specialist in the next 24-48 hours, and to 

return to the ED if they have worsening of their symptoms. 





Please note that this report is being documented using DRAGON technology.


This can lead to erroneous entry secondary to incorrect interpretation by the 

dictating instrument.











Tono Jolley MD Aug 22, 2019 19:59

## 2019-08-23 NOTE — DIAGNOSTIC IMAGING REPORT
Indication: Shortness of breath

 

Technique: One view of the chest

 

Comparison: 7/13/2019

 

Findings: Lungs and pleural spaces are clear. Heart size is normal. There is no

significant interim change

 

Impression: No acute process

## 2019-11-02 NOTE — NUR
ED Nurse Note:



Patient states,  she realised that she does not have [employer]i[facility], and wants to 
leave. ER MD notifyed, walked in to pt's room in [facility] to talk to her.

## 2019-11-02 NOTE — EMERGENCY ROOM REPORT
History of Present Illness


General


Chief Complaint:  Abdominal Pain


Source:  Patient, Medical Record





Present Illness


HPI


The patient presents with epigastric pain and vomiting.  She has a history of 

gastritis.  Apparently she had endoscopy August 8 at [place] which is reported 

"negative".  Today she was vomiting some blood.  She denies any melena at this 

time or hematochezia.  She rates the pain 8/10 at this time burning and aching 

epigastric nonradiating.  She was previously using Zofran which did not work 

well for her.  She also has Pepcid and Carafate.





History of partial gastrectomy.





The patient was seen here August 22.  She ended up signing out AGAINST MEDICAL 

ADVICE on that visit.  Please review the notes from Dr. Jolley who made 

several calls to try and ascertain the patient's history and past history.  No 

addition she was found to have vitals of blood in her purse.





In July she had a similar presentation.  At that time she refused to be 

transferred to [facility].  She signed out AGAINST MEDICAL ADVICE.





The patient was admitted in July 2017 with a GI bleed.  Discharge diagnoses:


1. Abdominal pain with possible gastrointestinal bleed.


2. Status post esophagogastroduodenoscopy with findings of small hiatal


hernia, gastric polyps, partial antrectomy, and clipping the jejunum


without any active bleed.





No fevers, chills, sore throat, chest pain, palpitations, dysuria, shortness of 

breath, joint pain, rashes, visual changes, dizziness, headache.





She was seen twice earlier this year for concussions.  She has a history of 

subdural hematoma which was removed in 2010.  In June when she was seen she had 

some dysdiadochokinesis.  She was discharged home.


Allergies:  


Coded Allergies:  


     MEPERIDINE (Verified  Allergy, Intermediate, Hives, 2/25/13)


 COPIED FROM UNCODED SECTION


     PANTOPRAZOLE (Verified  Allergy, Intermediate, 7/16/17)


     MORPHINE (Verified  Allergy, Unknown, ITCHING, 7/16/17)


     TRAMADOL (Verified  Allergy, Unknown, 2/17/19)


     METOCLOPRAMIDE (Verified  Adverse Reaction, Intermediate, "MY MUSCLE 

MUSCLE ARE FREEZING UP", 2/25/13)


 COPIED FROM UNCODED SECTION





Patient History


Past Medical History:  see triage record, old chart reviewed, other - Gastritis


Past Surgical History:  other - Subdural hematoma 2010, partial gastrectomy


Social History:  Reports: smoking; Denies: alcohol use, drug use


Social History Narrative


Veterinary tech


Last Menstrual Period:  N/A since 2016


Pregnant Now:  No


Reviewed Nursing Documentation:  PMH: Agreed; PSxH: Agreed





Nursing Documentation-PMH


Hx Cardiac Problems:  No - SUBDURAL HEMATOMA FEB 2010


Hx Hypertension:  No


Hx Pacemaker:  No


Hx Asthma:  No


Hx COPD:  No


Hx Diabetes:  No


Hx Cancer:  No


Hx Gastrointestinal Problems:  Yes - PUD, gastrectomy


Hx Dialysis:  No


Hx Neurological Problems:  No


Hx Cerebrovascular Accident:  No


Hx Transient Ischemic Attacks:  No


Hx Dementia:  No


Hx Alzheimer's Disease:  No


Hx Parkinson's Disease:  No


Hx Meningitis:  No


Hx Encephalitis:  No


Hx Seizures:  No


Hx Epilepsy:  No


Hx Multiple Sclerosis:  No


Hx Cerebral Palsy:  No


Hx Amyotrophic Lat Sclerosis:  No


Hx Guillian-Stockton Syndrome:  No


Hx Paralysis:  No


Hx Peripheral Neuropathy:  No


Hx Spinal Cord Injury:  No


Hx Head Trauma:  No


Hx Traumatic Brain Injury:  No


Hx Memory Loss:  No


Hx Concentration Difficulty:  No


Hx Speech Problem:  No


Hx Tremors:  No


Hx Vertigo:  No


Hx Dizziness:  No


Hx Syncope:  No


Hx Headaches:  Yes - POST SUBDURAL HEMATOMA REMOVAL


Hx Aphasia:  No


Hx Dysphasia:  No


Hx Numbness:  No


Hx Weakness:  No


Hx Fatigue:  No


Hx Neurologic Surgery:  Yes - HAD SUBDURAL HEMATOMA REMOVED


Hx Brain Shunt:  No





Review of Systems


All Other Systems:  negative except mentioned in HPI





Physical Exam





Vital Signs








  Date Time  Temp Pulse Resp B/P (MAP) Pulse Ox O2 Delivery O2 Flow Rate FiO2


 


11/2/19 17:47 98.4 77 18 125/86 (99) 96 Room Air  








Sp02 EP Interpretation:  reviewed, normal


General Appearance:  well appearing, no apparent distress, GCS 15, thin


Head:  normocephalic


Eyes:  bilateral eye normal inspection, bilateral eye PERRL, bilateral eye EOMI


ENT:  moist mucus membranes


Neck:  supple


Respiratory:  lungs clear, normal breath sounds


Cardiovascular #1:  regular rate, rhythm


Cardiovascular #2:  2+ radial (R)


Gastrointestinal:  normal inspection, normal bowel sounds, no mass, non-

distended, no guarding, no rebound, tenderness - Epigastric, scaphoid


Genitourinary:  no CVA tenderness


Musculoskeletal:  back normal, gait/station normal, normal range of motion


Neurologic:  alert, oriented x3, grossly normal


Psychiatric:  depressed affect


Skin:  no rash, warm/dry





Medical Decision Making


Diagnostic Impression:  


 Primary Impression:  


 Gastritis


 Qualified Codes:  K29.00 - Acute gastritis without bleeding


ER Course


Patient presents with vomiting and alleged blood with a history of gastritis.  

Differential includes gastritis, Cinda-Kate tear, pancreatitis, peptic ulcer 

disease, viral illness amongst others.  Patient evaluated with EKG, chest x-ray 

and labs..  Patient treated with Pepcid and Zofran.  Very complicated patient.





EKG without injury.  Chest x-ray no infiltrates.  CBC and CMP unremarkable.  

Lipase normal.





Patient threatens to sign out (in order to smoke).  Patient did not give urine 

sample.  She states she has no insurance at this time and this worries her.





When returned, requested more Zofran.





Given percocet.  Tolerating oral intake.  Still some gagging at times.





Patient again insisting on leaving.  





Discussed treatment plan with patient.  Also the need for close outpatient 

follow-up.  She was advised to return if she is not doing well.





Laboratory Tests








Test


  11/2/19


19:05 11/2/19


19:40


 


White Blood Count


  4.3 K/UL


(4.8-10.8)  L 


 


 


Red Blood Count


  3.90 M/UL


(4.20-5.40)  L 


 


 


Hemoglobin


  11.2 G/DL


(12.0-16.0)  L 


 


 


Hematocrit


  33.9 %


(37.0-47.0)  L 


 


 


Mean Corpuscular Volume 87 FL (80-99)   


 


Mean Corpuscular Hemoglobin


  28.7 PG


(27.0-31.0) 


 


 


Mean Corpuscular Hemoglobin


Concent 33.0 G/DL


(32.0-36.0) 


 


 


Red Cell Distribution Width


  12.8 %


(11.6-14.8) 


 


 


Platelet Count


  156 K/UL


(150-450) 


 


 


Mean Platelet Volume


  6.5 FL


(6.5-10.1) 


 


 


Neutrophils (%) (Auto)


  54.7 %


(45.0-75.0) 


 


 


Lymphocytes (%) (Auto)


  36.1 %


(20.0-45.0) 


 


 


Monocytes (%) (Auto)


  5.1 %


(1.0-10.0) 


 


 


Eosinophils (%) (Auto)


  2.7 %


(0.0-3.0) 


 


 


Basophils (%) (Auto)


  1.4 %


(0.0-2.0) 


 


 


Sodium Level


  144 MMOL/L


(136-145) 


 


 


Potassium Level


  4.1 MMOL/L


(3.5-5.1) 


 


 


Chloride Level


  108 MMOL/L


()  H 


 


 


Carbon Dioxide Level


  29 MMOL/L


(21-32) 


 


 


Anion Gap


  7 mmol/L


(5-15) 


 


 


Blood Urea Nitrogen


  14 mg/dL


(7-18) 


 


 


Creatinine


  0.5 MG/DL


(0.55-1.30)  L 


 


 


Estimate Glomerular


Filtration Rate > 60 mL/min


(>60) 


 


 


Glucose Level


  92 MG/DL


() 


 


 


Calcium Level


  9.5 MG/DL


(8.5-10.1) 


 


 


Magnesium Level


  2.4 MG/DL


(1.8-2.4) 


 


 


Total Bilirubin


  0.3 MG/DL


(0.2-1.0) 


 


 


Aspartate Amino Transferase


(AST) 38 U/L (15-37)


H 


 


 


Alanine Aminotransferase (ALT)


  42 U/L (12-78)


  


 


 


Alkaline Phosphatase


  60 U/L


() 


 


 


Troponin I


  0.000 ng/mL


(0.000-0.056) 


 


 


Total Protein


  7.5 G/DL


(6.4-8.2) 


 


 


Albumin


  4.6 G/DL


(3.4-5.0) 


 


 


Globulin 2.9 g/dL   


 


Albumin/Globulin Ratio 1.6 (1.0-2.7)   


 


Lipase


  109 U/L


() 


 


 


Serum Alcohol < 3 mg/dL   


 


Prothrombin Time


  


  11.0 SEC


(9.30-11.50)


 


Prothrombin Time INR  1.0 (0.9-1.1)  


 


PTT


  


  25 SEC (23-33)


 








EKG Diagnostic Results


Rate:  normal


Rhythm:  NSR


ST Segments:  no acute changes - Nonspecific ST-T wave changes





Rhythm Strip Diag. Results


Rhythm:  NSR, no PVC's, no ectopy





Chest X-Ray Diagnostic Results


Chest X-Ray Diagnostic Results :  


   Chest X-Ray Ordered:  Yes


   # of Views/Limited/Complete:  1 View


   Indication:  Other


   EP Interpretation:  Yes


   Interpretation:  no consolidation, no effusion, no pneumothorax, other - copd


   Impression:  Other





Last Vital Signs








  Date Time  Temp Pulse Resp B/P (MAP) Pulse Ox O2 Delivery O2 Flow Rate FiO2


 


11/2/19 22:10 98.4 86 18 110/82 96 Room Air  








Status:  improved


Disposition:  HOME, SELF-CARE


Condition:  Improved


Scripts


Hydrocodone Bit/Acetaminophen 5-325* (NORCO 5-325*) 1 Each Tablet


1 TAB ORAL Q6H PRN for For Pain, #6 TAB 0 Refills


   Prov: Handy Pepe MD         11/2/19 


Promethazine HCl (Promethegan) 25 Mg Supp.rect


25 MG RECTAL Q8HR PRN for Nausea & Vomiting, #6 SUPP 2 Refills


   Prov: Handy Pepe MD         11/2/19 


Promethazine Hcl* (PHENERGAN*) 25 Mg Tablet


25 MG ORAL Q8HR, #15 TAB 0 Refills


   Prov: Handy Pepe MD         11/2/19











Handy Pepe MD Nov 2, 2019 18:16

## 2019-11-02 NOTE — NUR
ED Nurse Note:

IV started by RACHEL nolen on patients left hand 22 gauge, informed Dr. hunt 
about the delay of blood collection. Contacted shabbir  for draw. will 
continue to monitor

## 2019-11-02 NOTE — NUR
ED Nurse Note:

Patient walked into ED c/o upper abodminal pain that has been an on going issue 
for the past 3 days accomapnied by N/V/D, patient is complaining of 8/10 pain, 
multiple nurses have attempted to start an IV however no success. patient is 
alert and oriented x4, ambulatory with a steady gait, VSS. will wait for 
further orders

## 2019-11-03 NOTE — DIAGNOSTIC IMAGING REPORT
Indication: Reason For Exam: ABD PAIN

 

Technique:  Single AP view of the chest.

 

Comparison: Chest radiograph dated 8/22/2019

 

Findings:

 

The cardiomediastinal silhouette is within normal limits when accounting for

rotation. Obscuration of the right heart border is likely due to rotation at the

posterior airspace consolidation. No pneumothorax or pleural effusion. Osseous

structures demonstrate no acute abnormality.

 

IMPRESSION:

 

1.  Obscuration of the right heart border, which is likely due to rotation of the

posterior airspace consolidation.

2.  Otherwise, no radiographic evidence of acute cardiopulmonary process.

## 2019-11-05 NOTE — CARDIOLOGY REPORT
--------------- APPROVED REPORT --------------





EKG Measurement

Heart [id number]

VA [id number]

[id number]

[id number]

[id number]





Normal sinus rhythm

Cannot rule out Anteroseptal infarct, age undetermined

Abnormal ECG

## 2019-12-16 NOTE — EMERGENCY ROOM REPORT
History of Present Illness


General


Chief Complaint:  Gastrointestinal Bleed


Source:  Patient





Present Illness


HPI


Patient presents with complaints of epigastric pain and vomiting blood she 

reports that this started this morning





She has a burning sensation as well in the epigastric area denies any diarrhea 

or lower abdominal pain denies any back or flank pain





Patient reports previous GI bleed in the past


Reports that she has not been able to follow-up with primary physician since 

her last visits to the emergency room here


Allergies:  


Coded Allergies:  


     MEPERIDINE (Verified  Allergy, Intermediate, Hives, 2/25/13)


 COPIED FROM UNCODED SECTION


     PANTOPRAZOLE (Verified  Allergy, Intermediate, 7/16/17)


     MORPHINE (Verified  Allergy, Unknown, ITCHING, 7/16/17)


     TRAMADOL (Verified  Allergy, Unknown, 2/17/19)


     METOCLOPRAMIDE (Verified  Adverse Reaction, Intermediate, "MY MUSCLE 

MUSCLE ARE FREEZING UP", 2/25/13)


 COPIED FROM UNCODED SECTION





Patient History


Past Medical History:  see triage record


Last Menstrual Period:  na


Reviewed Nursing Documentation:  PMH: Agreed; PSxH: Agreed





Nursing Documentation-PMH


Past Medical History:  No History, Except For


Hx Cardiac Problems:  No - SUBDURAL HEMATOMA FEB 2010


Hx Hypertension:  No


Hx Pacemaker:  No


Hx Asthma:  No


Hx COPD:  No


Hx Diabetes:  No


Hx Cancer:  No


Hx Gastrointestinal Problems:  Yes - PUD, gastrectomy


Hx Dialysis:  No


Hx Neurological Problems:  No


Hx Cerebrovascular Accident:  No


Hx Transient Ischemic Attacks:  No


Hx Dementia:  No


Hx Alzheimer's Disease:  No


Hx Parkinson's Disease:  No


Hx Meningitis:  No


Hx Encephalitis:  No


Hx Seizures:  No


Hx Epilepsy:  No


Hx Multiple Sclerosis:  No


Hx Cerebral Palsy:  No


Hx Amyotrophic Lat Sclerosis:  No


Hx Guillian-Montezuma Syndrome:  No


Hx Paralysis:  No


Hx Peripheral Neuropathy:  No


Hx Spinal Cord Injury:  No


Hx Head Trauma:  No


Hx Traumatic Brain Injury:  No


Hx Memory Loss:  No


Hx Concentration Difficulty:  No


Hx Speech Problem:  No


Hx Tremors:  No


Hx Vertigo:  No


Hx Dizziness:  No


Hx Syncope:  No


Hx Headaches:  Yes - POST SUBDURAL HEMATOMA REMOVAL


Hx Aphasia:  No


Hx Dysphasia:  No


Hx Numbness:  No


Hx Weakness:  No


Hx Fatigue:  No


Hx Neurologic Surgery:  Yes - HAD SUBDURAL HEMATOMA REMOVED


Hx Brain Shunt:  No





Review of Systems


All Other Systems:  negative except mentioned in HPI





Physical Exam





Vital Signs








  Date Time  Temp Pulse Resp B/P (MAP) Pulse Ox O2 Delivery O2 Flow Rate FiO2


 


12/16/19 16:15 97.5 77 20 104/69 (81) 98 Room Air  








Sp02 EP Interpretation:  reviewed, normal


General Appearance:  well appearing, no apparent distress


Head:  normocephalic, atraumatic


Eyes:  bilateral eye PERRL, bilateral eye EOMI


ENT:  hearing grossly normal, normal pharynx, TMs + canals normal, uvula midline


Neck:  full range of motion, supple, no meningismus, no bony tend


Respiratory:  lungs clear, normal breath sounds, no rhonchi, no respiratory 

distress, no retraction, no accessory muscle use


Cardiovascular #1:  normal peripheral pulses, regular rate, rhythm, no edema, 

no gallop, no JVD, no murmur


Gastrointestinal:  normal bowel sounds, non tender, soft, no mass, no 

organomegaly, non-distended, no guarding, no hernia, no pulsatile mass, no 

rebound


Genitourinary:  no CVA tenderness


Musculoskeletal:  normal inspection


Neurologic:  motor strength/tone normal, CNs III-XII nml as tested, oriented x3

, sensory intact, responsive


Psychiatric:  mood/affect normal


Lymphatic:  normal inspection, no adenopathy





Medical Decision Making


Diagnostic Impression:  


 Primary Impression:  


 Vomiting


ER Course


Multiple differentials including but not limited to upper GI bleed, gastritis, 

cholecystitis entertained


Patient was pending obtaining a CBC she was provided with antiemetic medication





At this time however patient reports that she did not want to wait further for 

her testing


And wanted to go home to rest denies any further vomiting while she was here


However given that we do not have patient's CBC at this time patient did leave 

AGAINST MEDICAL ADVICE


Rhythm Strip Diag. Results


EP Interpretation:  yes


Rate:  70


Rhythm:  NSR, no PVC's, no ectopy





Last Vital Signs








  Date Time  Temp Pulse Resp B/P (MAP) Pulse Ox O2 Delivery O2 Flow Rate FiO2


 


12/16/19 16:15 97.5 77 20 104/69 (81) 98 Room Air  








Status:  improved


Disposition:  AGAINST MEDICAL ADVICE


Condition:  Serious





Additional Instructions:  


Please return if you change your mind and want to be followed further











Pilar Ruggiero DO Dec 16, 2019 16:36

## 2019-12-21 NOTE — NUR
ED Nurse Note:



Patient transported on gurney to telemetry unit on cardiac monitor with 1 RN 
and ER tech in stable condition.

## 2019-12-21 NOTE — NUR
ED Nurse Note:



Per charge nurse, VRE and MRSA swab only done if patient was admitted into 
inpatient in last visit.

## 2019-12-21 NOTE — DIAGNOSTIC IMAGING REPORT
History: ABD PAIN

 

Exam: XR CXR 1 VIEW

 

Comparison: 11/2/2019

 

FINDINGS:

 

The lungs are clear.  The cardiac and mediastinal contours are within 

limits.  Surgical clips at the visualized upper abdomen again noted.  

Visualized osseous structures appear within limits.

 

IMPRESSION:

 

No evidence of acute disease.

## 2019-12-21 NOTE — EMERGENCY ROOM REPORT
History of Present Illness


General


Chief Complaint:  Gastrointestinal Bleed


Source:  Patient, Medical Record


 (Vilma Kuhn)





Present Illness


HPI


54-year-old female presents to the emergency department complaining of 8 out of 

10 severity of her epigastric abdominal pain in addition to vomiting blood.  

Patient reports she began vomiting dark blood clots approximately 1 hour prior 

to arrival and estimates a total of approximately half cup of blood total.  

Patient reports history of peptic ulcer disease with multiple GI bleeds in the 

past including need for blood transfusion.  Patient reports that last year at 

[facility] she had pylorectomy performed to reduce her symptoms.  Patient 

states she is currently taking Carafate as she has multiple allergies to 

different antiacid medications and pain medications.  She denies constipation 

or diarrhea.  She denies fatigue, dizziness, syncope or headache at this time.  

Patient states that she was here last week for similar symptoms however her 

symptoms subsided and she went home.  Patient states that her symptoms have 

returned.  No other aggravating or relieving factors at this time.  Patient 

denies chest pain or palpitations.  


 (Vilma Kuhn)


Allergies:  


Coded Allergies:  


     MEPERIDINE (Verified  Allergy, Intermediate, Hives, 2/25/13)


 COPIED FROM UNCODED SECTION


     PANTOPRAZOLE (Verified  Allergy, Intermediate, 7/16/17)


     MORPHINE (Verified  Allergy, Unknown, ITCHING, 7/16/17)


     TRAMADOL (Verified  Allergy, Unknown, 2/17/19)


     METOCLOPRAMIDE (Verified  Adverse Reaction, Intermediate, "MY MUSCLE 

MUSCLE ARE FREEZING UP", 2/25/13)


 COPIED FROM UNCODED SECTION





Patient History


Past Medical History:  see triage record


Past Surgical History:  other - pylorectomy 2018


Pertinent Family History:  none


Last Menstrual Period:  3 years ago


Pregnant Now:  No


Reviewed Nursing Documentation:  PMH: Agreed; PSxH: Agreed (Vilma Kuhn)





Nursing Documentation-PMH


Past Medical History:  No History, Except For


Hx Cardiac Problems:  No - SUBDURAL HEMATOMA FEB 2010


Hx Hypertension:  No


Hx Pacemaker:  No


Hx Asthma:  No


Hx COPD:  No


Hx Diabetes:  No


Hx Cancer:  No


Hx Gastrointestinal Problems:  Yes - PUD, gastrectomy


Hx Dialysis:  No


History Of Psychiatric Problem:  No


Hx Neurological Problems:  No


Hx Cerebrovascular Accident:  No


Hx Transient Ischemic Attacks:  No


Hx Dementia:  No


Hx Alzheimer's Disease:  No


Hx Parkinson's Disease:  No


Hx Meningitis:  No


Hx Encephalitis:  No


Hx Seizures:  No


Hx Epilepsy:  No


Hx Multiple Sclerosis:  No


Hx Cerebral Palsy:  No


Hx Amyotrophic Lat Sclerosis:  No


Hx Guillian-Niota Syndrome:  No


Hx Paralysis:  No


Hx Peripheral Neuropathy:  No


Hx Spinal Cord Injury:  No


Hx Head Trauma:  No


Hx Traumatic Brain Injury:  No


Hx Memory Loss:  No


Hx Concentration Difficulty:  No


Hx Speech Problem:  No


Hx Tremors:  No


Hx Vertigo:  No


Hx Dizziness:  No


Hx Syncope:  No


Hx Headaches:  Yes - POST SUBDURAL HEMATOMA REMOVAL


Hx Aphasia:  No


Hx Dysphasia:  No


Hx Numbness:  No


Hx Weakness:  No


Hx Fatigue:  No


Hx Neurologic Surgery:  Yes - HAD SUBDURAL HEMATOMA REMOVED


Hx Brain Shunt:  No


 (Vilma Kuhn)





Review of Systems


All Other Systems:  negative except mentioned in HPI


 (Vilma Kuhn)





Physical Exam





Vital Signs








  Date Time  Temp Pulse Resp B/P (MAP) Pulse Ox O2 Delivery O2 Flow Rate FiO2


 


12/21/19 16:39 98.2 68 16 120/75 (90) 99 Room Air  








 (Vilma Kuhn)





Medical Decision Making


PA Attestation


Dr. Alberto  is my supervising Physician whom patient management has been 

discussed with. 


 (Vilma Kuhn)


Diagnostic Impression:  


 Primary Impression:  


 GIB (gastrointestinal bleeding)


 Qualified Codes:  K92.2 - Gastrointestinal hemorrhage, unspecified


ER Course


54-year-old female presents to the emergency department complaining of 8 out of 

10 severity of her epigastric abdominal pain in addition to vomiting blood.  

Patient reports she began vomiting dark blood clots approximately 1 hour prior 

to arrival and estimates a total of approximately half cup of blood total.  

Patient reports history of peptic ulcer disease with multiple GI bleeds in the 

past including need for blood transfusion.  Patient reports that last year at 

[facility] she had pylorectomy performed to reduce her symptoms.  Patient 

states she is currently taking Carafate as she has multiple allergies to 

different antiacid medications and pain medications.  She denies constipation 

or diarrhea.  She denies fatigue, dizziness, syncope or headache at this time.  

Patient states that she was here last week for similar symptoms however her 

symptoms subsided and she went home.  Patient states that her symptoms have 

returned.  No other aggravating or relieving factors at this time.  Patient 

denies chest pain or palpitations.  





Ddx considered but are not limited to Diverticulitis, acute appy, diarrhea,UC, 

PUD, GE, pancreatitis, gallstone, anemia, Cinda-Kate tear





Vital signs: are WNL, pt. is afebrile





H&PE are most consistent with pt. with complicated GI history with return of 

Upper GI bleed 





ORDERS: 





CBC, CMP, lipase, UA


-PT/PTT


-UDS:








ED INTERVENTIONS: -





- 1000NS,  IV pepcid and zofran 4mg. 





DISPOSITION: at this time pt. will be admitted to Dr. Avilez for Acute GI Bleed.


Dr. Avilez  agreed to admit the pt. and to continue pt. care management.





Labs








Test


  12/21/19


17:49 12/21/19


18:53


 


White Blood Count


  5.0 K/UL


(4.8-10.8) 


 


 


Red Blood Count


  4.02 M/UL


(4.20-5.40) 


 


 


Hemoglobin


  11.5 G/DL


(12.0-16.0) 


 


 


Hematocrit


  35.3 %


(37.0-47.0) 


 


 


Mean Corpuscular Volume 88 FL (80-99)  


 


Mean Corpuscular Hemoglobin


  28.7 PG


(27.0-31.0) 


 


 


Mean Corpuscular Hemoglobin


Concent 32.7 G/DL


(32.0-36.0) 


 


 


Red Cell Distribution Width


  13.6 %


(11.6-14.8) 


 


 


Platelet Count


  127 K/UL


(150-450) 


 


 


Mean Platelet Volume


  6.6 FL


(6.5-10.1) 


 


 


Neutrophils (%) (Auto)


  61.3 %


(45.0-75.0) 


 


 


Lymphocytes (%) (Auto)


  31.0 %


(20.0-45.0) 


 


 


Monocytes (%) (Auto)


  3.3 %


(1.0-10.0) 


 


 


Eosinophils (%) (Auto)


  3.5 %


(0.0-3.0) 


 


 


Basophils (%) (Auto)


  1.0 %


(0.0-2.0) 


 


 


Prothrombin Time


  11.4 SEC


(9.30-11.50) 


 


 


Prothromb Time International


Ratio 1.1 (0.9-1.1) 


  


 


 


Activated Partial


Thromboplast Time 19 SEC (23-33) 


  


 


 


Sodium Level


  142 MMOL/L


(136-145) 


 


 


Potassium Level


  3.9 MMOL/L


(3.5-5.1) 


 


 


Chloride Level


  108 MMOL/L


() 


 


 


Carbon Dioxide Level


  26 MMOL/L


(21-32) 


 


 


Anion Gap


  8 mmol/L


(5-15) 


 


 


Blood Urea Nitrogen


  12 mg/dL


(7-18) 


 


 


Creatinine


  0.6 MG/DL


(0.55-1.30) 


 


 


Estimat Glomerular Filtration


Rate > 60 mL/min


(>60) 


 


 


Glucose Level


  86 MG/DL


() 


 


 


Calcium Level


  8.6 MG/DL


(8.5-10.1) 


 


 


Total Bilirubin


  0.2 MG/DL


(0.2-1.0) 


 


 


Aspartate Amino Transf


(AST/SGOT) 22 U/L (15-37) 


  


 


 


Alanine Aminotransferase


(ALT/SGPT) 30 U/L (12-78) 


  


 


 


Alkaline Phosphatase


  95 U/L


() 


 


 


Total Protein


  6.3 G/DL


(6.4-8.2) 


 


 


Albumin


  3.5 G/DL


(3.4-5.0) 


 


 


Globulin 2.8 g/dL  


 


Albumin/Globulin Ratio 1.2 (1.0-2.7)  


 


Lipase


  65 U/L


() 


 








 (Vilma Kuhn)


ER Course


Patient seen by PA initially.  Prior history of upper GI bleed with a 

gastrectomy.  She reports having increased hematemesis over the past few weeks 

which recurred approximately 2 hours prior to arrival.  Patient had prior 

history of gastric ulcer.  She had previous gastrectomy.Reports significant 

hematemesis.  Patient was discussed with Dr. Avilez for inpatient management as 

well as Dr. Marmolejo for GI consult.





Labs








Test


  12/21/19


17:49


 


White Blood Count


  5.0 K/UL


(4.8-10.8)


 


Red Blood Count


  4.02 M/UL


(4.20-5.40)


 


Hemoglobin


  11.5 G/DL


(12.0-16.0)


 


Hematocrit


  35.3 %


(37.0-47.0)


 


Mean Corpuscular Volume 88 FL (80-99) 


 


Mean Corpuscular Hemoglobin


  28.7 PG


(27.0-31.0)


 


Mean Corpuscular Hemoglobin


Concent 32.7 G/DL


(32.0-36.0)


 


Red Cell Distribution Width


  13.6 %


(11.6-14.8)


 


Platelet Count


  127 K/UL


(150-450)


 


Mean Platelet Volume


  6.6 FL


(6.5-10.1)


 


Neutrophils (%) (Auto)


  61.3 %


(45.0-75.0)


 


Lymphocytes (%) (Auto)


  31.0 %


(20.0-45.0)


 


Monocytes (%) (Auto)


  3.3 %


(1.0-10.0)


 


Eosinophils (%) (Auto)


  3.5 %


(0.0-3.0)


 


Basophils (%) (Auto)


  1.0 %


(0.0-2.0)


 


Prothrombin Time


  11.4 SEC


(9.30-11.50)


 


Prothromb Time International


Ratio 1.1 (0.9-1.1) 


 


 


Activated Partial


Thromboplast Time 19 SEC (23-33) 


 


 


Sodium Level


  142 MMOL/L


(136-145)


 


Potassium Level


  3.9 MMOL/L


(3.5-5.1)


 


Chloride Level


  108 MMOL/L


()


 


Carbon Dioxide Level


  26 MMOL/L


(21-32)


 


Anion Gap


  8 mmol/L


(5-15)


 


Blood Urea Nitrogen


  12 mg/dL


(7-18)


 


Creatinine


  0.6 MG/DL


(0.55-1.30)


 


Estimat Glomerular Filtration


Rate > 60 mL/min


(>60)


 


Glucose Level


  86 MG/DL


()


 


Calcium Level


  8.6 MG/DL


(8.5-10.1)


 


Total Bilirubin


  0.2 MG/DL


(0.2-1.0)


 


Aspartate Amino Transf


(AST/SGOT) 22 U/L (15-37) 


 


 


Alanine Aminotransferase


(ALT/SGPT) 30 U/L (12-78) 


 


 


Alkaline Phosphatase


  95 U/L


()


 


Total Protein


  6.3 G/DL


(6.4-8.2)


 


Albumin


  3.5 G/DL


(3.4-5.0)


 


Globulin 2.8 g/dL 


 


Albumin/Globulin Ratio 1.2 (1.0-2.7) 


 


Lipase


  65 U/L


()








 (Momo Alberto MD)





Chest X-Ray Diagnostic Results


Chest X-Ray Diagnostic Results :  


   Chest X-Ray Ordered:  Yes


   # of Views/Limited/Complete:  1 View


   Indication:  Other - abd pain


   EP Interpretation:  Yes


   PA Xray:  Interpretation reviewed, by supervising MD, and agrees with 

findings.


   Interpretation:  no consolidation, no effusion, no pneumothorax, no acute 

cardiopulmonary disease, other - NO free air under the diaphragm.


   Impression:  No acute disease


   Electronically Signed by:  Vilma Kuhn PA-C


 (Vilma Kuhn)





Last Vital Signs








  Date Time  Temp Pulse Resp B/P (MAP) Pulse Ox O2 Delivery O2 Flow Rate FiO2


 


12/21/19 16:39 98.2 68 16 120/75 (90) 99 Room Air  








 (Vilma Kuhn)


Disposition:  ADMITTED AS INPATIENT


Condition:  Serious


Physician Consult:  Dr. Carmona


 (Vilma Kuhn)


Scripts


Famotidine* (Pepcid 20mg tablet*) 20 Mg Tablet


20 MG ORAL TWICE A DAY, #60 TAB 0 Refills


   Prov: Garrison Avilez MD         12/22/19 


Sucralfate* (CARAFATE*) 1 Gm Tablet


1 GM ORAL Q6HR for 30 Days, #120 TAB


   Prov: Garrison Avilez MD         12/22/19











Vilma Kuhn Dec 21, 2019 17:31


Momo Alberto MD Dec 21, 2019 19:23

## 2019-12-21 NOTE — NUR
ED Nurse Note:



PT WALKED IN DUE TO VOMITING OF DARK RED BLOOD WITH ABD PAIN STARTED AN HOUR 
AGO. DENIES CP, DIZZINESS. AAO X4 AMBULATORY. PT STATES SHE WAS SEEN [facility] A 
COUPLE OF DAYS AGO FOR SAME SYMPTOM BUT LEFT.

## 2019-12-21 NOTE — NUR
NURSE NOTES:

Received pt from bridgett Goss. Pt arrived to floor alert, talkative, and in constant 
abdominal pain. Pt reports that pain meds aren't effective. Will call Dr. Avilez. Bed in 
lowest position. Skin intact. Call light within reach. Pt has had no episodes of emesis 
during admission. Will continue to monitor.

## 2019-12-21 NOTE — NUR
NURSE NOTES:

Called and left a message with Dr. Avilez regarding pts request for stronger pain meds and a 
nicotene patch. Dr. Avilez gave orders for the patch and reported that anything else will have 
to wait until he assessed her tomorrow. Will input orders and will continue to monitor.

## 2019-12-22 NOTE — NUR
RD ASSESSMENT & RECOMMENDATIONS

SEE CARE ACTIVITY FOR COMPLETE ASSESSMENT



DAILY ESTIMATED NEEDS:

Needs based on Underweight 48.2kg  

30-35  kcals/kg 

0818-7267  total kcals

1-1.5  g protein/kg

48-72  g total protein 

25-30  mL/kg

4493-7842  total fluid mLs



NUTRITION DIAGNOSIS:

Increased kcal and pro needs r/t underweight status as evidenced by pt is

@73% Ideal Body Weight, BMI underweight per guidelines, adm w/ possible

GIB, currently on CLD.



CURRENT DIET: CLd     





PO DIET RECOMMENDATIONS:

Per MD  





ADDITIONAL RECOMMENDATIONS:

1) Add Ensure Clear to clear liquid diet 

2) F/up w/ H&P  

3) Obtain a standing weight for accurate CBW  

4) Replete lytes as needed (Low K 3.3)

## 2019-12-22 NOTE — CONSULTATION
DATE OF CONSULTATION:  12/22/2019

NOTE: CANCELED DICTATION









  ______________________________________________

  Dominguez Orellana M.D.





DR:  NBA

D:  12/22/2019 06:52

T:  12/22/2019 09:02

JOB#:  [id number]

CC:

## 2019-12-22 NOTE — CONSULTATION
DATE OF CONSULTATION:  12/22/2019

GASTROENTEROLOGY CONSULTATION



CONSULTING PHYSICIAN:  Dominguez Orellana M.D.



CHIEF COMPLAINT:   I was asked to see this patient by Dr. Garrison Avilez for

evaluation of gastrointestinal bleeding.



HISTORY OF PRESENT ILLNESS:  The patient is a 54-year-old white woman, who

comes into the hospital with one-day history of severe epigastric

abdominal pain and multiple bouts of red emesis.  She states her stools

are brown and normal.  She states that she has had multiple procedures and

surgeries at [facility], but she could not recall the

name of her gastroenterologist there.  Review of her records showed that

she had a history of nonsteroidal anti-inflammatory drug associated peptic

ulcer disease and she underwent a distal gastrectomy and vagotomy in 2006.

Subsequently, she was found to have ______ lesions with anastomosis with

bleeding and she therefore underwent a partial gastrectomy with Latasha-en-Y

gastrojejunostomy in November 2017.  She subsequently had recurrent

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

therefore eventually underwent completion gastrectomy with feeding

jejunostomy placement in October 2018.  The patient is now here for

recurrence of her symptoms.  The [facility] records also notes a concern

for severe factitious disorder, which was verified by Psychiatry services.

Details of this information can be found on a note dated 12/17/2019.



PAST MEDICAL HISTORY:  History of recurrent gastrointestinal bleeding and

multiple gastrointestinal surgeries as described above.  Currently, the

patient is status post total gastrectomy.



FAMILY HISTORY:  Noncontributory.



SOCIAL HISTORY:  The patient smokes 1 pack of cigarettes a week.  She does

not drink alcohol.



ALLERGIES:  Protonix, Demerol, morphine, Reglan, and tramadol.



MEDICATIONS:  See chart list for details.



REVIEW OF SYSTEMS:  Otherwise negative.



PHYSICAL EXAMINATION:

GENERAL:  Thin white woman, seen in her room in no apparent

distress.

HEENT:  Normocephalic, atraumatic.  Sclerae are anicteric.  Oropharynx

clear.

NECK:  Supple.

CHEST:  Clear to auscultation.

CARDIOVASCULAR:  Revealed a regular rate.

ABDOMEN:  Soft.  There is epigastric tenderness without rebound or

guarding.

EXTREMITIES:  Revealed no edema.



LABORATORY DATA:  Noted.



ASSESSMENT:  This patient presents with one-day history of hematemesis and

epigastric abdominal pain, which is recurrent for this patient.  The

patient had a total gastrectomy for recurrence of symptoms.  Her blood

count will be checked on a p.r.n. basis and stool occult blood will be

checked as well.  Her histamine 2 blocker had been initiated although

_______ in the setting of total gastrectomy would be questionable.

Consideration will be made to perform an endoscopy to evaluate the upper

GI tract.  Carafate should be continued.



RECOMMENDATION:  Per above discussion and per orders in the chart.









  ______________________________________________

  Dominguez Orellana M.D.





DR:  ROSA

D:  12/22/2019 07:34

T:  12/22/2019 14:25

JOB#:  [id number]

CC:

## 2019-12-22 NOTE — NUR
NURSE NOTES: Left messages to Dr. Avilez and Dr. Orellana regarding patient's chief complain 
of pain 10/10. She stated that her pain is in the abdomen, 10/10 and the norco is not 
helping. I told her the next dose will be at 1100 and she stated: " i'm not taking it 
because it's not helping. IT"S NOT HELPING." Patient yelled. Charge nurse and primary nurse 
already explained to her that Dr. Avilez will come to see her soon and she has to wait for him 
to get her new medication order if he wants to. She refused to take her 1200 sucralfate 
which nurse explain it will also help with the abdomen. Awaiting for Dr. Avilez's response.

## 2019-12-22 NOTE — NUR
NURSE NOTES:

I witnessed pt giving written as well as verbal consent for Dr. Orellana to go through her 
medical records at [facility]. Consent signed and put in chart.

## 2019-12-22 NOTE — NUR
NURSE NOTES: Nurse report given by RACHEL Matos. Patient's sleeping in bed but easily awake, AO 
x 4, no s/s of distress or SOB. Bed low and locked, call light within reach, side rails x 2, 
cardiac monitor is on. IV is running fluid, no s/s of tenderness or infiltration. All needs 
met. Will continue to monitor closely.

## 2019-12-22 NOTE — DISCHARGE SUMMARY
DATE OF ADMISSION:  12/21/2019



DATE OF DISCHARGE:  12/22/2019



PERTINENT HISTORY:  The patient comes in with the reported hematemesis.



PHYSICAL EXAMINATION:  See the dictated History and Physical.  No

changes.



COURSE IN THE HOSPITAL:  The patient had no major bleeding while in the

hospital.  She was asking for narcotics that were declined by the

physician, as she had no real distress.  She was anxious and asked for

Xanax.  She wished to go home and I agreed this since she has had multiple

GI workups in the past, that staying in the hospital additional days would

no longer be of major help, and she was discharged home in stable

condition.



FINAL DIAGNOSES:

1. Upper intestinal bleeding.

2. History of multiple gastrointestinal workups in the past.

3. History of gastrectomy.

4. Anxiety disorder.

5. See the History and Physical for full details.



DISCHARGE DISPOSITION:  I gave her the following prescription:  Xanax 1 mg

three times a day, #30 as needed; Pepcid 40 mg daily; and Zofran OTC 4 mg

every four hours as needed, #20.  She already has the Carafate 1 g four

times a day which she takes at home.  She is to follow up with her prior

physicians.









  ______________________________________________

  Garrison Avilez M.D.





DR:  RALF

D:  12/22/2019 15:17

T:  12/22/2019 21:06

JOB#:  [id number]

CC:

## 2019-12-22 NOTE — HISTORY AND PHYSICAL REPORT
DATE OF ADMISSION:  12/21/2019

PERTINENT HISTORY:  The patient presents with hematemesis and abdominal

pain.  She has had a total gastrectomy in the past apparently for peptic

ulcer disease.  She has been taking Carafate at home.  She has apparently

had multiple admissions to [facility], multiple endoscopic procedures.  Also

of note, at [facility] records she has been suspected of pain seeking behavior

and surreptitiously taking medications that were not prescribed in the

hospital.



SURGICAL HISTORY:  Gastrectomy.  She has also had a subdural hematoma,

appendectomy.



ALLERGIES:  Meperidine, Protonix, morphine, tramadol, and metoclopramide.



MEDICATIONS:  Medications at home include Xanax 1 mg three times a day as

needed, Carafate 1 g four times a day, and Zofran OTC as needed.



HABITS:  She is a cigarette smoker.  Denies alcohol or drugs.



SOCIAL HISTORY:  She lives with a roommate.



SYSTEM REVIEW:

HEAD, EYES, EARS, NOSE, AND THROAT:  Vision and hearing is good.

ENDOCRINE:  No diabetes or thyroid disease.

PULMONARY:  No asthma, TB, or chronic cough.

CARDIAC:  No angina or MI.

GASTROINTESTINAL:  Gastrointestinal bleeding, as above.

GENITOURINARY:  No dysuria, hematuria, or kidney stones.

NEUROLOGIC:  No CVA, syncope, or seizures.



PHYSICAL EXAMINATION:

GENERAL:  The patient is alert lady, in no acute distress.  Thin.

VITAL SIGNS:  Temperature 97.9, respirations 18, pulse 67, and blood

pressure 113/51.

HEAD, EYES, EARS, NOSE, AND THROAT:  Sclerae are nonicteric.  Ocular

motions intact in all directions.  Oral mucosa moist.

NECK:  No adenopathy.

LUNGS:  Clear.

HEART:  Regular rate and rhythm.

ABDOMEN:  Soft.  There is no focal tenderness.

EXTREMITIES:  No edema, cyanosis, or clubbing.



LABORATORY DATA:  Pertinent labs show hemoglobin of 10.2.  Potassium 3.9,

repeat 3.3.



IMPRESSION:

1. Upper gastrointestinal bleeding.

2. History of gastrectomy.

3. History of pain seeking behavior in the past.

4. History of anxiety.



PLAN:  Serial labs and observe.  She is no longer having hematemesis.  She

wishes to go home.  See the discharge summary.









  ______________________________________________

  Garrison Avilez M.D.





DR:  RALF

D:  12/22/2019 15:14

T:  12/22/2019 20:40

JOB#:  [id number]

CC:

## 2020-08-17 NOTE — EMERGENCY ROOM REPORT
History of Present Illness


General


Chief Complaint:  Upper Extremity Injury


Source:  Patient


 (Vilma Kuhn)





Present Illness


HPI


54-year-old female who is right-hand dominant presents to the emergency 

department complaining of 7 out of 10 severity pain localized to the left hand 

since last night.  Patient reports a large picture frame fell on her hand and 

she had acute onset of pain.  Patient reports some swelling and bruising.  

Patient states that her symptoms did not improve overnight.  Patient reports 

pain exacerbation upon palpation or movement of the left hand.  She denies 

paresthesias.  She denies bleeding at this time.  No other aggravating or 

relieving factors.  Patient reports she took Tylenol with no relief.


 (Vilma Kuhn)


Allergies:  


Coded Allergies:  


     MEPERIDINE (Verified  Allergy, Intermediate, Hives, 2/25/13)


 COPIED FROM UNCODED SECTION


     PANTOPRAZOLE (Verified  Allergy, Intermediate, 7/16/17)


     MORPHINE (Verified  Allergy, Unknown, ITCHING, 7/16/17)


     TRAMADOL (Verified  Allergy, Unknown, 2/17/19)


     METOCLOPRAMIDE (Verified  Adverse Reaction, Intermediate, "MY MUSCLE 

MUSCLE ARE FREEZING UP", 2/25/13)


 COPIED FROM UNCODED SECTION





COVID-19 Screening


Contact w/high risk pt:  No


Experienced COVID-19 symptoms?:  No


COVID-19 Testing performed PTA:  No


 (Vilma Kuhn)





Patient History


Past Medical History:  see triage record


Past Surgical History:  none


Pertinent Family History:  none


Pregnant Now:  No


Reviewed Nursing Documentation:  PMH: Agreed; PSxH: Agreed (Vilma Kuhn)





Nursing Documentation-PMH


Past Medical History:  No History, Except For


Hx Cardiac Problems:  No - SUBDURAL HEMATOMA FEB 2010


Hx Hypertension:  No


Hx Pacemaker:  No


Hx Asthma:  No


Hx COPD:  No


Hx Diabetes:  No


Hx Cancer:  No


Hx Gastrointestinal Problems:  Yes - PUD, gastrectomy


Hx Dialysis:  No


Hx Neurological Problems:  No


Hx Cerebrovascular Accident:  No


Hx Transient Ischemic Attacks:  No


Hx Dementia:  No


Hx Alzheimer's Disease:  No


Hx Parkinson's Disease:  No


Hx Meningitis:  No


Hx Encephalitis:  No


Hx Seizures:  No


Hx Epilepsy:  No


Hx Multiple Sclerosis:  No


Hx Cerebral Palsy:  No


Hx Amyotrophic Lat Sclerosis:  No


Hx Guillian-Velarde Syndrome:  No


Hx Paralysis:  No


Hx Peripheral Neuropathy:  No


Hx Spinal Cord Injury:  No


Hx Head Trauma:  No


Hx Traumatic Brain Injury:  No


Hx Memory Loss:  No


Hx Concentration Difficulty:  No


Hx Speech Problem:  No


Hx Tremors:  No


Hx Vertigo:  No


Hx Dizziness:  No


Hx Syncope:  No


Hx Headaches:  Yes - POST SUBDURAL HEMATOMA REMOVAL


Hx Aphasia:  No


Hx Dysphasia:  No


Hx Numbness:  No


Hx Weakness:  No


Hx Fatigue:  No


Hx Neurologic Surgery:  Yes - HAD SUBDURAL HEMATOMA REMOVED


Hx Brain Shunt:  No


 (Vilma Kuhn)





Review of Systems


All Other Systems:  negative except mentioned in HPI


 (Vilma Kuhn)





Physical Exam





Vital Signs








  Date Time  Temp Pulse Resp B/P (MAP) Pulse Ox O2 Delivery O2 Flow Rate FiO2


 


8/17/20 15:33 98.2 85 16 113/82 (92) 95 Room Air  








Sp02 EP Interpretation:  reviewed, normal


General Appearance:  no apparent distress, alert, GCS 15, non-toxic


Head:  normocephalic, atraumatic


Eyes:  bilateral eye normal inspection, bilateral eye PERRL


ENT:  hearing grossly normal, normal voice


Neck:  full range of motion


Respiratory:  lungs clear, normal breath sounds, speaking full sentences


Cardiovascular #1:  regular rate, rhythm, normal capillary refill


Cardiovascular #2:  2+ radial (R), 2+ radial (L)


Musculoskeletal:  normal range of motion, gait/station normal, tender - TTP to 

the dorsum of the left 4th and 5th metacarpals, bruising over the 4th. NVI. 

FROM of fingers.


Neurologic:  alert, motor strength/tone normal, distal neuro normal, oriented x3

, sensory intact, responsive, speech normal, grossly normal, normal inspection


Psychiatric:  judgement/insight normal


Skin:  Ecchymosis/Bruising - Of the left fourth metacarpal


Lymphatic:  no adenopathy


 (Vilma Kuhn)





Medical Decision Making


PA Attestation


Dr. Pepe Is my supervising Physician whom patient management has been 

discussed with. 


 (Vilma Kuhn)


Diagnostic Impression:  


 Primary Impression:  


 Contusion of left hand, initial encounter


ER Course


54-year-old female who is right-hand dominant presents to the emergency 

department complaining of 7 out of 10 severity pain localized to the left hand 

since last night.  Patient reports a large picture frame fell on her hand and 

she had acute onset of pain.  Patient reports some swelling and bruising.  

Patient states that her symptoms did not improve overnight.  Patient reports 

pain exacerbation upon palpation or movement of the left hand.  She denies 

paresthesias.  She denies bleeding at this time.  No other aggravating or 

relieving factors.  Patient reports she took Tylenol with no relief.





Ddx considered but are not limited to Fracture, dislocation, contusion,  Sprain/

Strain/Spasm, boxers fracture just to name a few





Vital signs: are WNL, pt. is afebrile.





H&PE are most consistent with musculoskeletal injury will perform imaging to r/

o fractures/dislocations. 





ORDERS:





-  X-ray Left Hand 3 views    - negative for fx, Dislocation, or significant 

soft tissue injury, per preliminary read in ED, and signed by  DEXTER Kuhn

, my supervising physician has reviewed, and agrees with my interpretation.





ED INTERVENTIONS:





-  Norco 5mg PO


-Left hand/wrist volar comfort Splint --Denied by pt.


-- Left arm Sling Denied by pt.





DISCHARGE: At this time pt. is stable for d/c to home. Will provide printed 

patient care instructions, and any necessary prescriptions. Care plan and 

follow up instructions have been discussed with the patient prior to discharge.


 (Vilma Kuhn)





Other X-Ray Diagnostic Results


Other X-Ray Diagnostic Results :  


   X-Ray ordered:  Left Hand


   # of Views/Limited Vs Complete:  3 View


   Indication:  Pain


   EP Interpretation:  Yes


   PA Xray:  Interpretation reviewed, by supervising MD, and agrees with 

findings.


   Interpretation:  no dislocation, no soft tissue swelling, no fractures


   Impression:  No acute disease


   Electronically Signed by:  Vilma Kuhn PA-C


 (Vilma Kuhn)


Other X-Ray Diagnostic Results :  


   Electronically Signed by:  P A documentation of Xray reviewed by me and is 

accurate, Handy Pepe MD


 (Handy Pepe MD)





Last Vital Signs








  Date Time  Temp Pulse Resp B/P (MAP) Pulse Ox O2 Delivery O2 Flow Rate FiO2


 


8/17/20 15:34 98.2 85 16 113/82 95 Room Air  








 (Vilma Kuhn)


Disposition:  HOME, SELF-CARE


Condition:  Stable


Scripts


Acetaminophen* (TYLENOL EXTRA STRENGTH*) 500 Mg Tablet


500 MG ORAL Q6H, #20 TAB 0 Refills


   Prov: Vilma Kuhn         8/17/20


Referrals:  


[facility]





[facility]


Patient Instructions:  Contusion, Easy-to-Read





Additional Instructions:  


Take medications as directed. 





~ ~ An emergent medical condition has not been identified based on this 

patients presentation, exam and any necessary testing/imaging. The patient is 

determined to be stable for outpatient follow-up and management of symptoms by 

a primary care provider.





 ** Follow up with an ORTHOPEDIC SPECIALIST in 3-5 days, even if your symptoms 

have resolved. ** 





--Please review list of primary care clinics, if you do not already have a 

primary care provider who can give you an Orthopedic Referral. 





Return sooner to ED if new symptoms occur, or current symptoms become worse. 











- Please note that this Emergency Department Report was dictated using Venuu technology software, occasionally this can lead to 

erroneous entry secondary to interpretation by the dictation equipment.











Vilma Kuhn Aug 17, 2020 15:52


Handy Pepe MD Aug 18, 2020 17:33

## 2020-08-17 NOTE — DIAGNOSTIC IMAGING REPORT
Indication: Left hand pain

 

Technique: 3 views hand

 

Comparison: none

 

Findings: No acute fractures. No dislocations. The joint spaces are preserved.

 

Impression: Negative

## 2020-10-26 ENCOUNTER — HOSPITAL ENCOUNTER (INPATIENT)
Dept: HOSPITAL 72 - EMR | Age: 55
LOS: 3 days | Discharge: HOME | DRG: 243 | End: 2020-10-29
Payer: COMMERCIAL

## 2020-10-26 VITALS — WEIGHT: 123.9 LBS | HEIGHT: 69 IN | BODY MASS INDEX: 18.35 KG/M2

## 2020-10-26 VITALS — SYSTOLIC BLOOD PRESSURE: 142 MMHG | DIASTOLIC BLOOD PRESSURE: 82 MMHG

## 2020-10-26 VITALS — SYSTOLIC BLOOD PRESSURE: 130 MMHG | DIASTOLIC BLOOD PRESSURE: 74 MMHG

## 2020-10-26 DIAGNOSIS — K29.70: ICD-10-CM

## 2020-10-26 DIAGNOSIS — Z90.3: ICD-10-CM

## 2020-10-26 DIAGNOSIS — D62: ICD-10-CM

## 2020-10-26 DIAGNOSIS — K20.91: Primary | ICD-10-CM

## 2020-10-26 DIAGNOSIS — Z88.6: ICD-10-CM

## 2020-10-26 DIAGNOSIS — F41.9: ICD-10-CM

## 2020-10-26 DIAGNOSIS — Z98.84: ICD-10-CM

## 2020-10-26 DIAGNOSIS — Z88.8: ICD-10-CM

## 2020-10-26 DIAGNOSIS — E87.6: ICD-10-CM

## 2020-10-26 LAB
ADD MANUAL DIFF: NO
ALBUMIN SERPL-MCNC: 3.9 G/DL (ref 3.4–5)
ALBUMIN/GLOB SERPL: 1.8 {RATIO} (ref 1–2.7)
ALP SERPL-CCNC: 65 U/L (ref 46–116)
ALT SERPL-CCNC: 28 U/L (ref 12–78)
ANION GAP SERPL CALC-SCNC: 7 MMOL/L (ref 5–15)
APPEARANCE UR: CLEAR
APTT BLD: 25 SEC (ref 23–33)
APTT PPP: YELLOW S
AST SERPL-CCNC: 32 U/L (ref 15–37)
BASOPHILS NFR BLD AUTO: 1.4 % (ref 0–2)
BILIRUB SERPL-MCNC: 0.2 MG/DL (ref 0.2–1)
BUN SERPL-MCNC: 6 MG/DL (ref 7–18)
CALCIUM SERPL-MCNC: 8.8 MG/DL (ref 8.5–10.1)
CHLORIDE SERPL-SCNC: 106 MMOL/L (ref 98–107)
CO2 SERPL-SCNC: 29 MMOL/L (ref 21–32)
CREAT SERPL-MCNC: 0.7 MG/DL (ref 0.55–1.3)
EOSINOPHIL NFR BLD AUTO: 1.7 % (ref 0–3)
ERYTHROCYTE [DISTWIDTH] IN BLOOD BY AUTOMATED COUNT: 25.1 % (ref 11.6–14.8)
GLOBULIN SER-MCNC: 2.2 G/DL
GLUCOSE UR STRIP-MCNC: NEGATIVE MG/DL
HCT VFR BLD CALC: 36.6 % (ref 37–47)
HGB BLD-MCNC: 11.3 G/DL (ref 12–16)
INR PPP: 1 (ref 0.9–1.1)
KETONES UR QL STRIP: NEGATIVE
LEUKOCYTE ESTERASE UR QL STRIP: (no result)
LYMPHOCYTES NFR BLD AUTO: 21.2 % (ref 20–45)
MCV RBC AUTO: 85 FL (ref 80–99)
MONOCYTES NFR BLD AUTO: 6.8 % (ref 1–10)
NEUTROPHILS NFR BLD AUTO: 68.9 % (ref 45–75)
NITRITE UR QL STRIP: NEGATIVE
PH UR STRIP: 6.5 [PH] (ref 4.5–8)
PLATELET # BLD: 184 K/UL (ref 150–450)
POTASSIUM SERPL-SCNC: 3.4 MMOL/L (ref 3.5–5.1)
PROT UR QL STRIP: (no result)
RBC # BLD AUTO: 4.31 M/UL (ref 4.2–5.4)
SODIUM SERPL-SCNC: 142 MMOL/L (ref 136–145)
SP GR UR STRIP: 1.01 (ref 1–1.03)
UROBILINOGEN UR-MCNC: 1 MG/DL (ref 0–1)
WBC # BLD AUTO: 5.5 K/UL (ref 4.8–10.8)

## 2020-10-26 PROCEDURE — 71045 X-RAY EXAM CHEST 1 VIEW: CPT

## 2020-10-26 PROCEDURE — 86850 RBC ANTIBODY SCREEN: CPT

## 2020-10-26 PROCEDURE — 96374 THER/PROPH/DIAG INJ IV PUSH: CPT

## 2020-10-26 PROCEDURE — 81003 URINALYSIS AUTO W/O SCOPE: CPT

## 2020-10-26 PROCEDURE — 94150 VITAL CAPACITY TEST: CPT

## 2020-10-26 PROCEDURE — 94664 DEMO&/EVAL PT USE INHALER: CPT

## 2020-10-26 PROCEDURE — 84484 ASSAY OF TROPONIN QUANT: CPT

## 2020-10-26 PROCEDURE — 96375 TX/PRO/DX INJ NEW DRUG ADDON: CPT

## 2020-10-26 PROCEDURE — 85025 COMPLETE CBC W/AUTO DIFF WBC: CPT

## 2020-10-26 PROCEDURE — 80307 DRUG TEST PRSMV CHEM ANLYZR: CPT

## 2020-10-26 PROCEDURE — 96376 TX/PRO/DX INJ SAME DRUG ADON: CPT

## 2020-10-26 PROCEDURE — 86901 BLOOD TYPING SEROLOGIC RH(D): CPT

## 2020-10-26 PROCEDURE — 85610 PROTHROMBIN TIME: CPT

## 2020-10-26 PROCEDURE — 86900 BLOOD TYPING SEROLOGIC ABO: CPT

## 2020-10-26 PROCEDURE — 36415 COLL VENOUS BLD VENIPUNCTURE: CPT

## 2020-10-26 PROCEDURE — 83735 ASSAY OF MAGNESIUM: CPT

## 2020-10-26 PROCEDURE — 80053 COMPREHEN METABOLIC PANEL: CPT

## 2020-10-26 PROCEDURE — 83690 ASSAY OF LIPASE: CPT

## 2020-10-26 PROCEDURE — 83051 HEMOGLOBIN PLASMA: CPT

## 2020-10-26 PROCEDURE — 99291 CRITICAL CARE FIRST HOUR: CPT

## 2020-10-26 PROCEDURE — 94003 VENT MGMT INPAT SUBQ DAY: CPT

## 2020-10-26 PROCEDURE — 74018 RADEX ABDOMEN 1 VIEW: CPT

## 2020-10-26 PROCEDURE — 80048 BASIC METABOLIC PNL TOTAL CA: CPT

## 2020-10-26 PROCEDURE — 82270 OCCULT BLOOD FECES: CPT

## 2020-10-26 PROCEDURE — 85730 THROMBOPLASTIN TIME PARTIAL: CPT

## 2020-10-26 RX ADMIN — SUCRALFATE ONE GM: 1 TABLET ORAL at 14:56

## 2020-10-26 RX ADMIN — SUCRALFATE SCH GM: 1 TABLET ORAL at 17:47

## 2020-10-26 RX ADMIN — SUCRALFATE SCH GM: 1 TABLET ORAL at 17:54

## 2020-10-26 RX ADMIN — SODIUM CHLORIDE PRN MG: 9 INJECTION, SOLUTION INTRAVENOUS at 19:51

## 2020-10-26 RX ADMIN — SUCRALFATE ONE GM: 1 TABLET ORAL at 14:45

## 2020-10-26 RX ADMIN — HUMAN INSULIN SCH MLS/HR: 100 INJECTION, SOLUTION SUBCUTANEOUS at 17:45

## 2020-10-26 NOTE — EMERGENCY ROOM REPORT
Physical Exam





Vital Signs








  Date Time  Temp Pulse Resp B/P (MAP) Pulse Ox O2 Delivery O2 Flow Rate FiO2


 


10/26/20 12:55 97.9 86 19 120/78 (92) 97 Room Air  











Medical Decision Making


Diagnostic Impression:  


   Primary Impression:  


   GIB (gastrointestinal bleeding)


   Additional Impression:  


   Abdominal pain


ER Course


Assumed care of the patient from the previous provider at approximately 1430.  


Please refer to initial note for full history and physical exam.





Briefly, 55-year-old female presenting for evaluation of hematemesis this 

morning.  Prior history of GI bleed status post clipping and partial antrectomy 

in the past.  Last EGD was 2017 showing small hiatal hernia, gastric polyps and 

a surgical clip in the jejunum without active bleeding.  Patient is anaphylactic

to Protonix but was given IV Pepcid, Carafate, Zofran.  Hemoglobin 11.3.  Other 

labs within normal limits. Admitted to panel physician, Candie ayala





Last Vital Signs








  Date Time  Temp Pulse Resp B/P (MAP) Pulse Ox O2 Delivery O2 Flow Rate FiO2


 


10/26/20 12:55 97.9 86 19 120/78 (92) 97 Room Air  








Disposition:  ADMITTED AS INPATIENT


Condition:  Serious


Referrals:  


[facility],REFERRING (PCP)





Procedures


Critical Care Time


Critical Care Time


Total critical care time: Approximately 45 minutes





Due to a high probability of clinically significant, life threatening 

deterioration, the patient required the highest level of preparedness to 

intervene emergently and I personally spent this critical care time directly and

personally managing the patient. This critical care time included obtaining a 

history, examining the patient, pulse oximetry, ordering and reviewing studies, 

ordering treatments, evaluating response to treatment and updating management 

plan as needed, frequent reassessment and discussion with other providers as 

well as arranging for ultimate disposition.  This critical to care time was 

performed to assess and manage the high probability of life-threatening 

deterioration that could result in multiorgan failure.  This critical care time 

is separate from the separately billable procedures and treating other patients.











Jorge Freeman MD              Oct 26, 2020 15:59

## 2020-10-26 NOTE — DIAGNOSTIC IMAGING REPORT
Indication: Chest pain

 

Technique: One view of the chest

 

Comparison: 12/21/2019

 

Findings: Lungs and pleural spaces are clear. Heart size is normal. No significant

change

 

Impression: No acute process

## 2020-10-26 NOTE — HISTORY AND PHYSICAL
History of Present Illness


General


Reason for Hospitalization:  Gastrointestinal Bleed





Present Illness


HPI


Mrs. Lucero is a 55F with PMH PUD, antrectomy, and anxiety who presents for 

hematemesis.  Patient reports severe nausea and vomiting that started two days 

ago, however, today she had two episodes of coffee ground emesis.  She notes "1 

cup full and lots of blood clots."   Denies pre-syncope, lightheadedness, chest 

pain, sob, abdominal pain, diarrhea, constipation or melena.  She has been 

evaluated multiple times for UGIB.  Last EGD 2017 which was neg for acute  

bleed.  Denies NSAID use or alcohol use.  Her previous PUD was due to Hpylori 

many years ago.  She states her partial antrectomy was due to her PUD.  She 

takes Carafate and Omeprazole at home.  She also takes xanax for anxiety.  No 

recent travels or illnesses.  





In the ED, patient hemodynamically stable.  No respiratory compromise.  Hgb 

stable at 11.3.  She seems very anxious but otherwise no acute issues or 

complaints. 





PMH: PUD 2/2 h pylori, Anxiety 


Sx: Antrectomy, jejunal clips


Fx: no GI, cardiopulmonary hx


Soc: smokes 5 cigg per day, denies drinking or drug use


Allergies:  


Coded Allergies:  


     MEPERIDINE (Verified  Allergy, Intermediate, Hives, 2/25/13)


   COPIED FROM UNCODED SECTION


     PANTOPRAZOLE (Verified  Allergy, Intermediate, 7/16/17)


     MORPHINE (Verified  Allergy, Unknown, ITCHING, 7/16/17)


     TRAMADOL (Verified  Allergy, Unknown, 2/17/19)


     METOCLOPRAMIDE (Verified  Adverse Reaction, Intermediate, "MY MUSCLE MUSCLE

ARE FREEZING UP", 2/25/13)


   COPIED FROM UNCODED SECTION





COVID-19 Screening


Contact w/high risk pt:  No


Experienced COVID-19 symptoms?:  No





Medication History


Scheduled


Acetaminophen* (Tylenol Extra Strength*), 500 MG ORAL Q6H


Alprazolam* (Xanax*), 0.25 MG ORAL BID, (Reported)


Alprazolam* (Xanax*), Unknown Dose ORAL THREE TIMES A DAY, (Reported)


Alprazolam* (Xanax*), 1 TAB ORAL THREE TIMES A DAY, (Reported)


Esomeprazole Magnesium (Nexium), 20 MG ORAL DAILY, (Reported)


Famotidine (Pepcid Ac), 20 MG PO DAILY


Famotidine (Pepcid Ac), 20 MG PO DAILY, (Reported)


Famotidine* (Pepcid 20mg tablet*), 20 MG ORAL TWICE A DAY


Pantoprazole* (Protonix*), 40 MG PO DAILY, (Reported)


Sucralfate* (Carafate*), 1 GM ORAL DAILY, (Reported)


Sucralfate* (Carafate*), 1 GM ORAL Q6HR


Sucralfate* (Carafate*), 1 GM ORAL BID, (Reported)





Scheduled PRN


Hydrocodone Bit/Acetaminophen 5-325* (Norco 5-325 Tablet*), 1 TAB ORAL Q6HR PRN 

for For Pain


Hydrocodone Bit/Acetaminophen 5-325* (Norco 5-325*), 1 TAB ORAL Q6H PRN for For 

Pain


Hydrocodone Bit/Acetaminophen 5-325* (Norco 5-325*), 1 TAB ORAL Q6H PRN for For 

Pain


Hydrocodone Bit/Acetaminophen 5-325* (Norco 5-325*), 1 TAB ORAL Q6H PRN for For 

Pain


Hydrocodone Bit/Acetaminophen 5-325* (Norco 5-325*), 1 TAB ORAL Q6H PRN for For 

Pain


Hydrocodone/Acetaminophen 5-325* (Hydrocodone/Acetaminophen 5-325*), 1 TAB ORAL 

Q6H PRN for For Pain


Ibuprofen (Motrin), 600 MG ORAL Q8H PRN for For Pain


Ibuprofen (Motrin), 600 MG ORAL Q6H PRN for For Pain


Ondansetron (Zofran), 4 MG SL Q6H PRN for Nausea & Vomiting


Ondansetron Odt* (Zofran Odt*), 4 MG ORAL Q6H PRN for Nausea & Vomiting


Ondansetron* (Zofran*), 4 MG ORAL Q6H PRN for Nausea & Vomiting


Ondansetron* (Zofran*), 4 MG ORAL Q6H PRN for Nausea & Vomiting, (Reported)


Promethazine HCl (Promethegan), 25 MG RECTAL Q8HR PRN for Nausea & Vomiting





Patient History


Healthcare decision maker


N


Resuscitation status





Advanced Directive on File








Review of Systems


Constitutional:  Denies: no symptoms, see HPI, chills, sweats, fever, malaise, 

weakness, other


Eye:  Denies: no symptoms, see HPI, eye pain, blurred vision, tearing, double 

vision, nose pain, nose congestion, acuity changes, discharge, other


ENT:  Denies: no symptoms, see HPI, ear pain, ear discharge, nose pain, nose 

congestion, throat pain, throat swelling, mouth pain, hearing loss, nasal 

discharge, other


Respiratory:  Denies: no symptoms, see HPI, cough, orthopnea, shortness of 

breath, stridor, wheezing, HERNANDEZ, sputum, other


Cardiovascular:  Denies: no symptoms, see HPI, chest pain, edema, palpitations, 

syncope, PND, other


Gastrointestinal:  Reports: vomiting, hematemesis; Denies: no symptoms, see HPI,

abdominal pain, constipation, diarrhea, nausea, melena, other


Genitourinary:  Denies: no symptoms, see HPI, discharge, dysuria, frequency, 

hematuria, pain, retention, incontinence, urgency, vag bleed/dc, other


Musculoskeletal:  Denies: no symptoms, see HPI, back pain, gout, joint pain, 

joint swelling, muscle pain, muscle stiffness, other


Skin:  Denies: no symptoms, see HPI, rash, change in color, change in 

hair/nails, dryness, lesions, other


Psychiatric:  Denies: no symptoms, see HPI, prior hx, anxiety, depressed 

feelings, emotional problems, SI, HI, hallucinations, other


Neurological:  Denies: no symptoms, see HPI, headache, numbness, paresthesia, 

seizure, tingling, tremors, focal weakness, syncope, dizziness, other


Endocrine:  Denies: no symptoms, see HPI, excessive sweating, flushing, 

intolerance to temperature, increased thirst, increased urine, unexplained 

weight loss, other


Hematologic/Lymphatic:  Denies: no symptoms, see HPI, anemia, blood clots, easy 

bleeding, easy bruising, swollen glands, diathesis, other





Physical Exam


General Appearance:  no apparent distress, alert oriented x3


HEENT:  normocephalic, atraumatic, PERRL


Neck:  non-tender, normal alignment


Respiratory/Chest:  lungs clear, normal breath sounds, no respiratory distress


Cardiovascular/Chest:  normal rate, regular rhythm, no JVD


Abdomen:  normal bowel sounds, non tender, soft


Extremities:  normal range of motion, non-tender


Skin Exam:  normal pigmentation, warm/dry


Neurologic:  CNs II-XII grossly normal, oriented x 3


Musculoskeletal:  normal muscle bulk, no effusion





Last 24 Hour Vital Signs








  Date Time  Temp Pulse Resp B/P (MAP) Pulse Ox O2 Delivery O2 Flow Rate FiO2


 


10/26/20 16:34  78 19 142/82 97 Room Air  


 


10/26/20 13:00  86 19   Room Air  


 


10/26/20 12:55 97.9 86 19 120/78 (92) 97 Room Air  











Laboratory Tests








Test


 10/26/20


13:41 10/26/20


14:35 10/26/20


15:10


 


Urine Color Yellow    


 


Urine Appearance Clear    


 


Urine pH 6.5 (4.5-8.0)    


 


Urine Specific Gravity


 1.015


(1.005-1.035) 


 





 


Urine Protein


 1+ (NEGATIVE)


H 


 





 


Urine Glucose (UA)


 Negative


(NEGATIVE) 


 





 


Urine Ketones


 Negative


(NEGATIVE) 


 





 


Urine Blood


 Negative


(NEGATIVE) 


 





 


Urine Nitrite


 Negative


(NEGATIVE) 


 





 


Urine Bilirubin


 Negative


(NEGATIVE) 


 





 


Urine Urobilinogen


 1 MG/DL


(0.0-1.0)  H 


 





 


Urine Leukocyte Esterase


 1+ (NEGATIVE)


H 


 





 


Urine RBC


 0 /HPF (0 - 2)


 


 





 


Urine WBC


 0-2 /HPF (0 -


2) 


 





 


Urine Squamous Epithelial


Cells Few /LPF


(NONE/OCC) 


 





 


Urine Bacteria


 Few /HPF


(NONE) 


 





 


Urine Mucus


 Few /LPF


(NONE/OCC)  H 


 





 


White Blood Count


 


 5.5 K/UL


(4.8-10.8) 





 


Red Blood Count


 


 4.31 M/UL


(4.20-5.40) 





 


Hemoglobin


 


 11.3 G/DL


(12.0-16.0)  L 





 


Hematocrit


 


 36.6 %


(37.0-47.0)  L 





 


Mean Corpuscular Volume  85 FL (80-99)   


 


Mean Corpuscular Hemoglobin


 


 26.1 PG


(27.0-31.0)  L 





 


Mean Corpuscular Hemoglobin


Concent 


 30.7 G/DL


(32.0-36.0)  L 





 


Red Cell Distribution Width


 


 25.1 %


(11.6-14.8)  H 





 


Platelet Count


 


 184 K/UL


(150-450) 





 


Mean Platelet Volume


 


 7.2 FL


(6.5-10.1) 





 


Neutrophils (%) (Auto)


 


 68.9 %


(45.0-75.0) 





 


Lymphocytes (%) (Auto)


 


 21.2 %


(20.0-45.0) 





 


Monocytes (%) (Auto)


 


 6.8 %


(1.0-10.0) 





 


Eosinophils (%) (Auto)


 


 1.7 %


(0.0-3.0) 





 


Basophils (%) (Auto)


 


 1.4 %


(0.0-2.0) 





 


Sodium Level


 


 142 MMOL/L


(136-145) 





 


Potassium Level


 


 3.4 MMOL/L


(3.5-5.1)  L 





 


Chloride Level


 


 106 MMOL/L


() 





 


Carbon Dioxide Level


 


 29 MMOL/L


(21-32) 





 


Anion Gap


 


 7 mmol/L


(5-15) 





 


Blood Urea Nitrogen


 


 6 mg/dL (7-18)


L 





 


Creatinine


 


 0.7 MG/DL


(0.55-1.30) 





 


Estimat Glomerular Filtration


Rate 


 > 60 mL/min


(>60) 





 


Glucose Level


 


 99 MG/DL


() 





 


Calcium Level


 


 8.8 MG/DL


(8.5-10.1) 





 


Total Bilirubin


 


 0.2 MG/DL


(0.2-1.0) 





 


Aspartate Amino Transf


(AST/SGOT) 


 32 U/L (15-37)


 





 


Alanine Aminotransferase


(ALT/SGPT) 


 28 U/L (12-78)


 





 


Alkaline Phosphatase


 


 65 U/L


() 





 


Troponin I


 


 0.000 ng/mL


(0.000-0.056) 





 


Total Protein


 


 6.1 G/DL


(6.4-8.2)  L 





 


Albumin


 


 3.9 G/DL


(3.4-5.0) 





 


Globulin  2.2 g/dL   


 


Albumin/Globulin Ratio  1.8 (1.0-2.7)   


 


Lipase


 


 67 U/L


()  L 





 


Prothrombin Time


 


 


 10.8 SEC


(9.30-11.50)


 


Prothromb Time International


Ratio 


 


 1.0 (0.9-1.1)  





 


Activated Partial


Thromboplast Time 


 


 25 SEC (23-33)











Height (Feet):  5


Height (Inches):  9.00


Weight (Pounds):  100


Medications





Current Medications








 Medications


  (Trade)  Dose


 Ordered  Sig/Sean


 Route


 PRN Reason  Start Time


 Stop Time Status Last Admin


Dose Admin


 


 Acetaminophen


  (Tylenol)  650 mg  Q4H  PRN


 ORAL


 Mild Pain (Pain Scale 1-3)  10/26/20 17:00


 11/25/20 16:59   





 


 Acetaminophen


  (Tylenol)  650 mg  Q4H  PRN


 ORAL


 Temp >100.5  10/26/20 17:00


 11/25/20 16:59   





 


 Albuterol/


 Ipratropium


  (Albuterol/


 Ipratropium)  3 ml  Q4H  PRN


 HHN


 Shortness of Breath  10/26/20 17:00


 10/31/20 16:59   





 


 Dextrose


  (Dextrose 50%)  25 ml  Q30M  PRN


 IV


 Hypoglycemia  10/26/20 17:00


 1/24/21 16:59   





 


 Dextrose


  (Dextrose 50%)  50 ml  Q30M  PRN


 IV


 Hypoglycemia  10/26/20 17:00


 1/24/21 16:59   





 


 Famotidine


  (Pepcid I.v.)  20 mg  Q12HR


 IVP


   10/26/20 21:00


 11/25/20 20:59   





 


 Ondansetron HCl


  (Zofran)  4 mg  Q6H  PRN


 IVP


 Nausea & Vomiting  10/26/20 17:00


 11/25/20 16:59   





 


 Polyethylene


 Glycol


  (Miralax)  17 gm  HSPRN  PRN


 ORAL


 Constipation  10/26/20 17:00


 11/25/20 16:59   





 


 Sodium Chloride  1,000 ml @ 


 100 mls/hr  Q10H


 IVLG


   10/26/20 18:00


 11/25/20 17:59   














Assessment/Plan


Diagnosis


Axis I:


Mrs. Lucero is a 55F with PMH PUD, antrectomy, and anxiety who presents for 

hematemesis





A:





# ?Upper GIB 2/2 PUD, gastritis


# Mild Acute blood loss anemia vs ALEXSANDER


# Hx of Anxiety


# Hx of Peptic Ulcer Disease 2/2 H. Pylori


# Hx of Antrectomy


# Elevated BP





P





- hemodynamically stable


- trend H&H q8h


- f/u occult stool


- apparently allergic to Protonix even though shes on omeprazole at home, so 

will continue IV Pepcid


- continue home Carafate 


- transfuse if Hgb < 7


- IVF


- NPO


- consult Dr. Marmolejo, GI, recs appreciated





CODE: Full





GI: IV Pepcid


DVT: SCDs


Diet: NPO


Fluids: D5 





Dispo: pending GI possible EGD, go home tomorrow if negative





In addition to the usual care above I spent additional time reviewing records in

the EMR and paper charts including physician documentation, nursing 

documentation, lab results, imaging and clinical documentation.  Total time 

included was 31 min.





Time spent on this encounter was 45 minutes which included 25 minutes of c

ounseling and care coordination.  I discussed with the nurse at bedside.  





Time of note may not reflect time patient was seen.











Kanu Hector D.O              Oct 26, 2020 17:37

## 2020-10-26 NOTE — EMERGENCY ROOM REPORT
History of Present Illness


General


Chief Complaint:  Gastrointestinal Bleed


Source:  Patient





Present Illness


HPI


55-year-old female with history of peptic ulcer disease and upper GI bleed here 

with hematemesis.  Patient says that she has had severe vomiting for the past 2 

days and that earlier today she vomited bright red blood.  She has felt 

lightheaded and pale and nauseous.  Says she has an anaphylactic reaction to 

Protonix.  She has undergone EGDs in the past for treatment of peptic ulcers 

causing bleeding.  She is in severe pain as well.  She is having sharp 

epigastric abdominal pain.  No chest pain, palpitations, shortness of breath, 

back pain, diarrhea, dysuria.


Allergies:  


Coded Allergies:  


     MEPERIDINE (Verified  Allergy, Intermediate, Hives, 2/25/13)


   COPIED FROM UNCODED SECTION


     PANTOPRAZOLE (Verified  Allergy, Intermediate, 7/16/17)


     MORPHINE (Verified  Allergy, Unknown, ITCHING, 7/16/17)


     TRAMADOL (Verified  Allergy, Unknown, 2/17/19)


     METOCLOPRAMIDE (Verified  Adverse Reaction, Intermediate, "MY MUSCLE MUSCLE

ARE FREEZING UP", 2/25/13)


   COPIED FROM UNCODED SECTION





COVID-19 Screening


Contact w/high risk pt:  No


Experienced COVID-19 symptoms?:  No


COVID-19 Screening:  Negative COVID-19


COVID-19 Testing Source:  clinic





Patient History


Pregnant Now:  No





Nursing Documentation-PMH


Hx Cardiac Problems:  No - SUBDURAL HEMATOMA FEB 2010


Hx Hypertension:  No


Hx Pacemaker:  No


Hx Asthma:  No


Hx COPD:  No


Hx Diabetes:  No


Hx Cancer:  No


Hx Gastrointestinal Problems:  Yes - PUD, gastrectomy


Hx Dialysis:  No


Hx Neurological Problems:  No


Hx Cerebrovascular Accident:  No


Hx Transient Ischemic Attacks:  No


Hx Dementia:  No


Hx Alzheimer's Disease:  No


Hx Parkinson's Disease:  No


Hx Meningitis:  No


Hx Encephalitis:  No


Hx Seizures:  No


Hx Epilepsy:  No


Hx Multiple Sclerosis:  No


Hx Cerebral Palsy:  No


Hx Amyotrophic Lat Sclerosis:  No


Hx Guillian-[place] Syndrome:  No


Hx Paralysis:  No


Hx Peripheral Neuropathy:  No


Hx Spinal Cord Injury:  No


Hx Head Trauma:  No


Hx Traumatic Brain Injury:  No


Hx Memory Loss:  No


Hx Concentration Difficulty:  No


Hx Speech Problem:  No


Hx Tremors:  No


Hx Vertigo:  No


Hx Dizziness:  No


Hx Syncope:  No


Hx Headaches:  Yes - POST SUBDURAL HEMATOMA REMOVAL


Hx Aphasia:  No


Hx Dysphasia:  No


Hx Numbness:  No


Hx Weakness:  No


Hx Fatigue:  No


Hx Neurologic Surgery:  Yes - HAD SUBDURAL HEMATOMA REMOVED


Hx Brain Shunt:  No





Physical Exam





Vital Signs








  Date Time  Temp Pulse Resp B/P (MAP) Pulse Ox O2 Delivery O2 Flow Rate FiO2


 


10/26/20 12:55 97.9 86 19 120/78 (92) 97 Room Air  











Medical Decision Making


Diagnostic Impression:  


   Primary Impression:  


   GIB (gastrointestinal bleeding)


   Additional Impression:  


   Abdominal pain


ER Course





Laboratory Tests








Test


 10/26/20


13:41


 


Urine Color Yellow  


 


Urine Appearance Clear  


 


Urine pH 6.5 (4.5-8.0)  


 


Urine Specific Gravity


 1.015


(1.005-1.035)


 


Urine Protein


 1+ (NEGATIVE)


H


 


Urine Glucose (UA)


 Negative


(NEGATIVE)


 


Urine Ketones


 Negative


(NEGATIVE)


 


Urine Blood


 Negative


(NEGATIVE)


 


Urine Nitrite


 Negative


(NEGATIVE)


 


Urine Bilirubin


 Negative


(NEGATIVE)


 


Urine Urobilinogen


 1 MG/DL


(0.0-1.0)  H


 


Urine Leukocyte Esterase


 1+ (NEGATIVE)


H


 


Urine RBC


 0 /HPF (0 - 2)





 


Urine WBC


 0-2 /HPF (0 -


2)


 


Urine Squamous Epithelial


Cells Few /LPF


(NONE/OCC)


 


Urine Bacteria


 Few /HPF


(NONE)


 


Urine Mucus


 Few /LPF


(NONE/OCC)  H








55-year-old female with history of peptic ulcer disease status post gastrectomy 

here with abdominal pain and hematemesis.  Patient is hemodynamically stable in 

the emergency department but is uncomfortable secondary to her abdominal pain.  

Urinalysis negative.  Currently waiting results of CBC, CMP, lipase.  She will 

be given normal saline, Zofran, Carafate, IV Pepcid.  She has an anaphylactic 

allergy to Protonix.  Signed out to oncoming physician.  Patient will likely be 

admitted for further management of her hematemesis and upper GI bleed.





Last Vital Signs








  Date Time  Temp Pulse Resp B/P (MAP) Pulse Ox O2 Delivery O2 Flow Rate FiO2


 


10/26/20 12:55 97.9 86 19 120/78 (92) 97 Room Air  

















Mike Kang M.D.                Oct 26, 2020 14:04

## 2020-10-27 VITALS — DIASTOLIC BLOOD PRESSURE: 86 MMHG | SYSTOLIC BLOOD PRESSURE: 135 MMHG

## 2020-10-27 VITALS — DIASTOLIC BLOOD PRESSURE: 78 MMHG | SYSTOLIC BLOOD PRESSURE: 127 MMHG

## 2020-10-27 VITALS — SYSTOLIC BLOOD PRESSURE: 131 MMHG | DIASTOLIC BLOOD PRESSURE: 74 MMHG

## 2020-10-27 VITALS — SYSTOLIC BLOOD PRESSURE: 137 MMHG | DIASTOLIC BLOOD PRESSURE: 75 MMHG

## 2020-10-27 VITALS — DIASTOLIC BLOOD PRESSURE: 73 MMHG | SYSTOLIC BLOOD PRESSURE: 105 MMHG

## 2020-10-27 VITALS — SYSTOLIC BLOOD PRESSURE: 111 MMHG | DIASTOLIC BLOOD PRESSURE: 70 MMHG

## 2020-10-27 LAB
ADD MANUAL DIFF: NO
ANION GAP SERPL CALC-SCNC: 7 MMOL/L (ref 5–15)
BASOPHILS NFR BLD AUTO: 1.6 % (ref 0–2)
BUN SERPL-MCNC: 6 MG/DL (ref 7–18)
CALCIUM SERPL-MCNC: 8.2 MG/DL (ref 8.5–10.1)
CHLORIDE SERPL-SCNC: 107 MMOL/L (ref 98–107)
CO2 SERPL-SCNC: 29 MMOL/L (ref 21–32)
CREAT SERPL-MCNC: 0.7 MG/DL (ref 0.55–1.3)
EOSINOPHIL NFR BLD AUTO: 7.3 % (ref 0–3)
ERYTHROCYTE [DISTWIDTH] IN BLOOD BY AUTOMATED COUNT: 24.2 % (ref 11.6–14.8)
HCT VFR BLD CALC: 36.6 % (ref 37–47)
HGB BLD-MCNC: 11.3 G/DL (ref 12–16)
LYMPHOCYTES NFR BLD AUTO: 43.7 % (ref 20–45)
MCV RBC AUTO: 84 FL (ref 80–99)
MONOCYTES NFR BLD AUTO: 5.7 % (ref 1–10)
NEUTROPHILS NFR BLD AUTO: 41.7 % (ref 45–75)
PLATELET # BLD: 187 K/UL (ref 150–450)
POTASSIUM SERPL-SCNC: 2.8 MMOL/L (ref 3.5–5.1)
RBC # BLD AUTO: 4.36 M/UL (ref 4.2–5.4)
SODIUM SERPL-SCNC: 144 MMOL/L (ref 136–145)
WBC # BLD AUTO: 4.1 K/UL (ref 4.8–10.8)

## 2020-10-27 RX ADMIN — SUCRALFATE SCH GM: 1 TABLET ORAL at 08:09

## 2020-10-27 RX ADMIN — SODIUM CHLORIDE PRN MG: 9 INJECTION, SOLUTION INTRAVENOUS at 23:29

## 2020-10-27 RX ADMIN — HUMAN INSULIN SCH MLS/HR: 100 INJECTION, SOLUTION SUBCUTANEOUS at 01:50

## 2020-10-27 RX ADMIN — SODIUM CHLORIDE PRN MG: 9 INJECTION, SOLUTION INTRAVENOUS at 08:09

## 2020-10-27 RX ADMIN — SODIUM CHLORIDE PRN MG: 9 INJECTION, SOLUTION INTRAVENOUS at 19:22

## 2020-10-27 RX ADMIN — SODIUM CHLORIDE PRN MG: 9 INJECTION, SOLUTION INTRAVENOUS at 15:13

## 2020-10-27 RX ADMIN — HUMAN INSULIN SCH MLS/HR: 100 INJECTION, SOLUTION SUBCUTANEOUS at 22:33

## 2020-10-27 RX ADMIN — SODIUM CHLORIDE PRN MG: 9 INJECTION, SOLUTION INTRAVENOUS at 01:51

## 2020-10-27 RX ADMIN — HUMAN INSULIN SCH MLS/HR: 100 INJECTION, SOLUTION SUBCUTANEOUS at 13:04

## 2020-10-27 RX ADMIN — SUCRALFATE SCH GM: 1 TABLET ORAL at 18:00

## 2020-10-27 NOTE — DIAGNOSTIC IMAGING REPORT
Indication: Abdominal pain

 

Technique: Supine view of the abdomen

 

Comparison: none

 

Findings: , Gas pattern is unremarkable. Surgical clips are seen in the epigastric

region. There is mild to moderate retained stool. Bones are unremarkable. No masses

or unusual calcifications

 

Impression: No acute process

## 2020-10-27 NOTE — GENERAL PROGRESS NOTE
Subjective


Date patient seen:  Oct 27, 2020


ROS Limited/Unobtainable:  No


Constitutional:  Denies: no symptoms, chills, diaphoresis, fever, malaise, 

weakness, other


HEENT:  Denies: no symptoms, eye pain, blurred vision, tearing, double vision, 

ear pain, ear discharge, nose pain, nose congestion, throat pain, throat 

swelling, mouth pain, mouth swelling, other


Cardiovascular:  Denies: no symptoms, chest pain, edema, irregular heart rate, 

lightheadedness, palpitations, syncope, other


Respiratory:  Denies: no symptoms, cough, orthopnea, shortness of breath, SOB 

with excertion, SOB at rest, sputum, stridor, wheezing, other


Gastrointestinal/Abdominal:  Reports: no symptoms, abdomen distended, abdominal 

pain, black stools, tarry stools, blood in stool, constipated, diarrhea, 

difficulty swallowing, nausea, poor appetite, poor fluid intake, rectal bleeding

, vomiting, other


Genitourinary:  Denies: no symptoms, burning, discharge, frequency, flank pain, 

hematuria, incontinence, pain, urgency, other


Neurologic/Psychiatric:  Denies: no symptoms, anxiety, depressed, emotional 

problems, headache, numbness, paresthesia, pre-existing deficit, seizure, 

tingling, tremors, weakness, other


Endocrine:  Denies: no symptoms, excessive sweating, flushing, intolerance to 

cold, intolerance to heat, increased hunger, increased thirst, increased urine, 

unexplained weight gain, unexplained weight loss, other


Hematologic/Lymphatic:  Denies: no symptoms, anemia, easy bleeding, easy 

bruising, other


Allergies:  


Coded Allergies:  


     MEPERIDINE (Verified  Allergy, Intermediate, Hives, 2/25/13)


   COPIED FROM UNCODED SECTION


     PANTOPRAZOLE (Verified  Allergy, Intermediate, 7/16/17)


     MORPHINE (Verified  Allergy, Unknown, ITCHING, 7/16/17)


     TRAMADOL (Verified  Allergy, Unknown, 2/17/19)


     METOCLOPRAMIDE (Verified  Adverse Reaction, Intermediate, "MY MUSCLE MUSCLE

ARE FREEZING UP", 2/25/13)


   COPIED FROM UNCODED SECTION


Subjective


nauseous and reports throwing up some blood early this morning. epigastric pain 

not controlled on current pain regimen. denies any othe symptoms at this time





Objective





Last 24 Hour Vital Signs








  Date Time  Temp Pulse Resp B/P (MAP) Pulse Ox O2 Delivery O2 Flow Rate FiO2


 


10/27/20 09:00      Room Air  


 


10/27/20 08:00 97.5 66 18 127/78 (94) 99   


 


10/27/20 07:25  70 18  98 Room Air  21


 


10/27/20 04:00 97.9 70 18 131/74 (93) 98   


 


10/27/20 00:00 97.7 63 20 135/86 (102) 98   


 


10/26/20 21:00      Room Air  


 


10/26/20 20:00 98.2 61 20 130/74 (92) 97   


 


10/26/20 19:32  80 18  97 Room Air  21


 


10/26/20 17:55      Room Air  


 


10/26/20 17:20  78 19 138/80 97 Room Air  


 


10/26/20 16:34  78 19 142/82 97 Room Air  


 


10/26/20 13:00  86 19   Room Air  


 


10/26/20 12:55 97.9 86 19 120/78 (92) 97 Room Air  

















Intake and Output  


 


 10/26/20 10/27/20





 19:00 07:00


 


Intake Total 100 ml 1000 ml


 


Balance 100 ml 1000 ml


 


  


 


Intake Oral 0 ml 


 


IV Total 100 ml 1000 ml


 


# Voids 2 3


 


# Bowel Movements 1 1








Laboratory Tests


10/26/20 13:41: 


Urine Color Yellow, Urine Appearance Clear, Urine pH 6.5, Urine Specific Gravity

1.015, Urine Protein 1+H, Urine Glucose (UA) Negative, Urine Ketones Negative, 

Urine Blood Negative, Urine Nitrite Negative, Urine Bilirubin Negative, Urine 

Urobilinogen 1H, Urine Leukocyte Esterase 1+H, Urine RBC 0, Urine WBC 0-2, Urine

Squamous Epithelial Cells Few, Urine Bacteria Few, Urine Mucus FewH


10/26/20 14:35: 


White Blood Count 5.5, Red Blood Count 4.31, Hemoglobin 11.3L, Hematocrit 36.6L,

Mean Corpuscular Volume 85, Mean Corpuscular Hemoglobin 26.1L, Mean Corpuscular 

Hemoglobin Concent 30.7L, Red Cell Distribution Width 25.1H, Platelet Count 184,

Mean Platelet Volume 7.2, Neutrophils (%) (Auto) 68.9, Lymphocytes (%) (Auto) 

21.2, Monocytes (%) (Auto) 6.8, Eosinophils (%) (Auto) 1.7, Basophils (%) (Auto)

1.4, Sodium Level 142, Potassium Level 3.4L, Chloride Level 106, Carbon Dioxide 

Level 29, Anion Gap 7, Blood Urea Nitrogen 6L, Creatinine 0.7, Estimat Glome

rular Filtration Rate > 60, Glucose Level 99, Calcium Level 8.8, Total Bilirubin

0.2, Aspartate Amino Transf (AST/SGOT) 32, Alanine Aminotransferase (ALT/SGPT) 

28, Alkaline Phosphatase 65, Troponin I 0.000, Total Protein 6.1L, Albumin 3.9, 

Globulin 2.2, Albumin/Globulin Ratio 1.8, Lipase 67L


10/26/20 15:10: 


Prothrombin Time 10.8, Prothromb Time International Ratio 1.0, Activated Partial

Thromboplast Time 25


10/27/20 05:40: 


White Blood Count 4.1L, Red Blood Count 4.36, Hemoglobin 11.3L, Hematocrit 36.6L

, Mean Corpuscular Volume 84, Mean Corpuscular Hemoglobin 25.9L, Mean 

Corpuscular Hemoglobin Concent 30.9L, Red Cell Distribution Width 24.2H, 

Platelet Count 187, Mean Platelet Volume 7.2, Neutrophils (%) (Auto) 41.7L, 

Lymphocytes (%) (Auto) 43.7, Monocytes (%) (Auto) 5.7, Eosinophils (%) (Auto) 

7.3H, Basophils (%) (Auto) 1.6, Sodium Level 144, Potassium Level 2.8L, Chloride

Level 107, Carbon Dioxide Level 29, Anion Gap 7, Blood Urea Nitrogen 6L, 

Creatinine 0.7, Estimat Glomerular Filtration Rate > 60, Glucose Level 136H, 

Calcium Level 8.2L, Plasma Hemoglobin [Pending], Magnesium Level 1.9


Height (Feet):  5


Height (Inches):  9.00


Weight (Pounds):  100


General Appearance:  alert, mild distress


EENT:  PERRL/EOMI


Neck:  supple


Cardiovascular:  normal rate, regular rhythm


Respiratory/Chest:  lungs clear, normal breath sounds


Abdomen:  soft, tender - mild epigastric tenderness


Edema:  non-pitting


Neurologic:  alert, oriented x 3, normal mood/affect





Assessment/Plan


Problem List:  


(1) GIB (gastrointestinal bleeding)


ICD Codes:  K92.2 - GIB (gastrointestinal bleeding)


SNOMED:  06658594


(2) Gastrointestinal hemorrhage


ICD Codes:  K92.2 - Gastrointestinal hemorrhage, unspecified


SNOMED:  62860537


(3) Abdominal pain


ICD Codes:  R10.9 - Unspecified abdominal pain


SNOMED:  05747394


(4) GIB (gastrointestinal bleeding)


ICD Codes:  K92.2 - Gastrointestinal hemorrhage, unspecified


SNOMED:  [id number]


Status:  stable


Assessment/Plan:


Mrs. Lucero is a 55F with PMH PUD, antrectomy, and anxiety who presents for 

hematemesis





A:





# ?Upper GIB 2/2 PUD, gastritis


# Mild Acute blood loss anemia vs ALEXSANDER


# Hx of Anxiety


# Hx of Peptic Ulcer Disease 2/2 H. Pylori


# Hx of Antrectomy


# Elevated BP


# Hypokalemia 





P





- hemodynamically stable


- Hgb stable at ~11, trend CBC  


- f/u occult stool


- apparently allergic to Protonix even though shes on omeprazole at home, so 

will continue IV Pepcid


- continue home Carafate 


- transfuse if Hgb < 7


- IVF


- NPO after MN. Diet today. EGD cancelled today due to severe hypokalemia


-Zofran not helping fully. added compazine in addition. 


-replete K, repeat BMP in AM. 


- consult Dr. Marmolejo, GI, recs appreciated





CODE: Full





GI: IV Pepcid


DVT: SCDs


Diet: soft diet 


Fluids: D5 





Dispo: pending GI possible EGD, go home tomorrow if negative





In addition to the usual care above I spent additional time reviewing records in

 the EMR and paper charts including physician documentation, nursing 

documentation, lab results, imaging and clinical documentation.  Total time inc

luded was 32 min.





Time of note may not reflect time patient was seen.











Abundio Jenkins MD          Oct 27, 2020 11:24

## 2020-10-27 NOTE — GENERAL PROGRESS NOTE
Subjective


ROS Limited/Unobtainable:  Yes


Allergies:  


Coded Allergies:  


     MEPERIDINE (Verified  Allergy, Intermediate, Hives, 2/25/13)


   COPIED FROM UNCODED SECTION


     PANTOPRAZOLE (Verified  Allergy, Intermediate, 7/16/17)


     MORPHINE (Verified  Allergy, Unknown, ITCHING, 7/16/17)


     TRAMADOL (Verified  Allergy, Unknown, 2/17/19)


     METOCLOPRAMIDE (Verified  Adverse Reaction, Intermediate, "MY MUSCLE MUSCLE

ARE FREEZING UP", 2/25/13)


   COPIED FROM UNCODED SECTION





Objective





Last 24 Hour Vital Signs








  Date Time  Temp Pulse Resp B/P (MAP) Pulse Ox O2 Delivery O2 Flow Rate FiO2


 


10/27/20 07:25  70 18  98 Room Air  21


 


10/27/20 04:00 97.9 70 18 131/74 (93) 98   


 


10/27/20 00:00 97.7 63 20 135/86 (102) 98   


 


10/26/20 21:00      Room Air  


 


10/26/20 20:00 98.2 61 20 130/74 (92) 97   


 


10/26/20 19:32  80 18  97 Room Air  21


 


10/26/20 17:55      Room Air  


 


10/26/20 17:20  78 19 138/80 97 Room Air  


 


10/26/20 16:34  78 19 142/82 97 Room Air  


 


10/26/20 13:00  86 19   Room Air  


 


10/26/20 12:55 97.9 86 19 120/78 (92) 97 Room Air  

















Intake and Output  


 


 10/26/20 10/27/20





 19:00 07:00


 


Intake Total 100 ml 1000 ml


 


Balance 100 ml 1000 ml


 


  


 


Intake Oral 0 ml 


 


IV Total 100 ml 1000 ml


 


# Voids 2 3


 


# Bowel Movements 1 1








Laboratory Tests


10/26/20 13:41: 


Urine Color Yellow, Urine Appearance Clear, Urine pH 6.5, Urine Specific Gravity

1.015, Urine Protein 1+H, Urine Glucose (UA) Negative, Urine Ketones Negative, 

Urine Blood Negative, Urine Nitrite Negative, Urine Bilirubin Negative, Urine 

Urobilinogen 1H, Urine Leukocyte Esterase 1+H, Urine RBC 0, Urine WBC 0-2, Urine

Squamous Epithelial Cells Few, Urine Bacteria Few, Urine Mucus FewH


10/26/20 14:35: 


White Blood Count 5.5, Red Blood Count 4.31, Hemoglobin 11.3L, Hematocrit 36.6L,

Mean Corpuscular Volume 85, Mean Corpuscular Hemoglobin 26.1L, Mean Corpuscular 

Hemoglobin Concent 30.7L, Red Cell Distribution Width 25.1H, Platelet Count 184,

Mean Platelet Volume 7.2, Neutrophils (%) (Auto) 68.9, Lymphocytes (%) (Auto) 

21.2, Monocytes (%) (Auto) 6.8, Eosinophils (%) (Auto) 1.7, Basophils (%) (Auto)

1.4, Sodium Level 142, Potassium Level 3.4L, Chloride Level 106, Carbon Dioxide 

Level 29, Anion Gap 7, Blood Urea Nitrogen 6L, Creatinine 0.7, Estimat 

Glomerular Filtration Rate > 60, Glucose Level 99, Calcium Level 8.8, Total 

Bilirubin 0.2, Aspartate Amino Transf (AST/SGOT) 32, Alanine Aminotransferase 

(ALT/SGPT) 28, Alkaline Phosphatase 65, Troponin I 0.000, Total Protein 6.1L, 

Albumin 3.9, Globulin 2.2, Albumin/Globulin Ratio 1.8, Lipase 67L


10/26/20 15:10: 


Prothrombin Time 10.8, Prothromb Time International Ratio 1.0, Activated Partial

Thromboplast Time 25


10/27/20 05:40: 


White Blood Count 4.1L, Red Blood Count 4.36, Hemoglobin 11.3L, Hematocrit 36.6L

, Mean Corpuscular Volume 84, Mean Corpuscular Hemoglobin 25.9L, Mean 

Corpuscular Hemoglobin Concent 30.9L, Red Cell Distribution Width 24.2H, 

Platelet Count 187, Mean Platelet Volume 7.2, Neutrophils (%) (Auto) 41.7L, 

Lymphocytes (%) (Auto) 43.7, Monocytes (%) (Auto) 5.7, Eosinophils (%) (Auto) 

7.3H, Basophils (%) (Auto) 1.6, Sodium Level 144, Potassium Level 2.8L, Chloride

Level 107, Carbon Dioxide Level 29, Anion Gap 7, Blood Urea Nitrogen 6L, 

Creatinine 0.7, Estimat Glomerular Filtration Rate > 60, Glucose Level 136H, 

Calcium Level 8.2L, Plasma Hemoglobin [Pending], Magnesium Level 1.9


Height (Feet):  5


Height (Inches):  9.00


Weight (Pounds):  100


General Appearance:  alert


EENT:  normal ENT inspection


Neck:  supple


Cardiovascular:  tachycardia


Respiratory/Chest:  decreased breath sounds


Abdomen:  hypoactive bowel sounds, tender


Extremities:  non-tender





Assessment/Plan


Assessment/Plan:


hematemesis


h/o antrectomy








EGD was canceled today by anesthesia due to low K


carafate


fu H&H


zofran and reglan


pepcis


drug screen


replace K


EGD in am











Álvaro Marmolejo MD             Oct 27, 2020 09:39

## 2020-10-28 VITALS — SYSTOLIC BLOOD PRESSURE: 124 MMHG | DIASTOLIC BLOOD PRESSURE: 75 MMHG

## 2020-10-28 VITALS — DIASTOLIC BLOOD PRESSURE: 70 MMHG | SYSTOLIC BLOOD PRESSURE: 107 MMHG

## 2020-10-28 VITALS — DIASTOLIC BLOOD PRESSURE: 84 MMHG | SYSTOLIC BLOOD PRESSURE: 149 MMHG

## 2020-10-28 VITALS — SYSTOLIC BLOOD PRESSURE: 129 MMHG | DIASTOLIC BLOOD PRESSURE: 75 MMHG

## 2020-10-28 VITALS — SYSTOLIC BLOOD PRESSURE: 146 MMHG | DIASTOLIC BLOOD PRESSURE: 83 MMHG

## 2020-10-28 VITALS — DIASTOLIC BLOOD PRESSURE: 77 MMHG | SYSTOLIC BLOOD PRESSURE: 125 MMHG

## 2020-10-28 VITALS — DIASTOLIC BLOOD PRESSURE: 66 MMHG | SYSTOLIC BLOOD PRESSURE: 125 MMHG

## 2020-10-28 VITALS — SYSTOLIC BLOOD PRESSURE: 118 MMHG | DIASTOLIC BLOOD PRESSURE: 63 MMHG

## 2020-10-28 VITALS — SYSTOLIC BLOOD PRESSURE: 136 MMHG | DIASTOLIC BLOOD PRESSURE: 72 MMHG

## 2020-10-28 VITALS — DIASTOLIC BLOOD PRESSURE: 84 MMHG | SYSTOLIC BLOOD PRESSURE: 145 MMHG

## 2020-10-28 LAB
ADD MANUAL DIFF: NO
ALBUMIN SERPL-MCNC: 3.5 G/DL (ref 3.4–5)
ALBUMIN/GLOB SERPL: 1.7 {RATIO} (ref 1–2.7)
ALP SERPL-CCNC: 60 U/L (ref 46–116)
ALT SERPL-CCNC: 22 U/L (ref 12–78)
ANION GAP SERPL CALC-SCNC: 5 MMOL/L (ref 5–15)
AST SERPL-CCNC: 15 U/L (ref 15–37)
BASOPHILS NFR BLD AUTO: 1.2 % (ref 0–2)
BILIRUB SERPL-MCNC: 0.2 MG/DL (ref 0.2–1)
BUN SERPL-MCNC: 5 MG/DL (ref 7–18)
CALCIUM SERPL-MCNC: 8.4 MG/DL (ref 8.5–10.1)
CHLORIDE SERPL-SCNC: 108 MMOL/L (ref 98–107)
CO2 SERPL-SCNC: 29 MMOL/L (ref 21–32)
CREAT SERPL-MCNC: 0.7 MG/DL (ref 0.55–1.3)
EOSINOPHIL NFR BLD AUTO: 4.4 % (ref 0–3)
ERYTHROCYTE [DISTWIDTH] IN BLOOD BY AUTOMATED COUNT: 24 % (ref 11.6–14.8)
GLOBULIN SER-MCNC: 2.1 G/DL
HCT VFR BLD CALC: 35.6 % (ref 37–47)
HGB BLD-MCNC: 10.9 G/DL (ref 12–16)
LYMPHOCYTES NFR BLD AUTO: 31.8 % (ref 20–45)
MCV RBC AUTO: 85 FL (ref 80–99)
MONOCYTES NFR BLD AUTO: 5.7 % (ref 1–10)
NEUTROPHILS NFR BLD AUTO: 56.9 % (ref 45–75)
PLATELET # BLD: 190 K/UL (ref 150–450)
POTASSIUM SERPL-SCNC: 3.9 MMOL/L (ref 3.5–5.1)
RBC # BLD AUTO: 4.21 M/UL (ref 4.2–5.4)
SODIUM SERPL-SCNC: 142 MMOL/L (ref 136–145)
WBC # BLD AUTO: 4.8 K/UL (ref 4.8–10.8)

## 2020-10-28 PROCEDURE — 0DB38ZX EXCISION OF LOWER ESOPHAGUS, VIA NATURAL OR ARTIFICIAL OPENING ENDOSCOPIC, DIAGNOSTIC: ICD-10-PCS

## 2020-10-28 RX ADMIN — SUCRALFATE SCH GM: 1 TABLET ORAL at 09:00

## 2020-10-28 RX ADMIN — SODIUM CHLORIDE PRN MG: 9 INJECTION, SOLUTION INTRAVENOUS at 03:29

## 2020-10-28 RX ADMIN — SODIUM CHLORIDE PRN MG: 9 INJECTION, SOLUTION INTRAVENOUS at 20:52

## 2020-10-28 RX ADMIN — SUCRALFATE SCH GM: 1 TABLET ORAL at 08:50

## 2020-10-28 RX ADMIN — SODIUM CHLORIDE PRN MG: 9 INJECTION, SOLUTION INTRAVENOUS at 08:50

## 2020-10-28 RX ADMIN — SUCRALFATE SCH GM: 1 TABLET ORAL at 17:19

## 2020-10-28 RX ADMIN — SODIUM CHLORIDE PRN MG: 9 INJECTION, SOLUTION INTRAVENOUS at 16:55

## 2020-10-28 RX ADMIN — SODIUM CHLORIDE PRN MG: 9 INJECTION, SOLUTION INTRAVENOUS at 12:55

## 2020-10-28 RX ADMIN — HUMAN INSULIN SCH MLS/HR: 100 INJECTION, SOLUTION SUBCUTANEOUS at 09:45

## 2020-10-28 NOTE — IMMEDIATE POST-OP EVALUATION
Immediate Post-Op Evalulation


Immediate Post-Op Evalulation


Procedure:  EGD


Date of Evaluation:  Oct 28, 2020


Time of Evaluation:  11:42


IV Fluids:  200


Blood Pressure Systolic:  107


Blood Pressure Diastolic:  70


Pulse Rate:  69


Respiratory Rate:  14


O2 Sat by Pulse Oximetry:  99


Temperature (Fahrenheit):  97.4


Nausea:  No


Vomiting:  No


Patient Status:  awake, reacts, patent


Hydration Status:  adequate


Drug:  none











Jennifer Winston CRNA     Oct 28, 2020 11:43

## 2020-10-28 NOTE — ANETHESIA PREOPERATIVE EVAL
Anesthesia Pre-op PMH/ROS


General


Date of Evaluation:  Oct 28, 2020


Time of Evaluation:  11:24


Anesthesiologist:  zachery


ASA Score:  ASA 2


Mallampati Score


Class I : Soft palate, uvula, fauces, pillars visible


Class II: Soft palate, uvula, fauces visible


Class III: Soft palate, base of uvula visible


Class IV: Only hard plate visible


Mallampati Classification:  Class II


Surgeon:  power


Diagnosis:  GERD


Surgical Procedure:  EGD


Social History:  smoking, current smoker


Family History:  no anesthesia problems


Allergies:  


Coded Allergies:  


     MEPERIDINE (Verified  Allergy, Intermediate, Hives, 2/25/13)


   COPIED FROM UNCODED SECTION


     PANTOPRAZOLE (Verified  Allergy, Intermediate, 7/16/17)


     MORPHINE (Verified  Allergy, Unknown, ITCHING, 7/16/17)


     TRAMADOL (Verified  Allergy, Unknown, 2/17/19)


     METOCLOPRAMIDE (Verified  Adverse Reaction, Intermediate, "MY MUSCLE MUSCLE

ARE FREEZING UP", 2/25/13)


   COPIED FROM UNCODED SECTION


Medications:  see eMAR


Patient NPO?:  Yes


NPO Date:  Oct 28, 2020


NPO Time:  00:01





Past Medical History


Cardiovascular:  Denies: HTN, CAD, MI, valve dz, arrhythmia, other


Pulmonary:  Denies: asthma, COPD, KARAN, other


Gastrointestinal/Genitourinary:  Reports: GERD; 


   Denies: CRI, ESRD, other


Neurologic/Psychiatric:  Reports: depression/anxiety; 


   Denies: dementia, CVA, TIA, other


Endocrine:  Denies: DM, hypothyroidism, steroids, other


PSxH Narrative:


antrectomy





Anesthesia Pre-op Phys. Exam


Physician Exam





Last Vital Signs








  Date Time  Temp Pulse Resp B/P (MAP) Pulse Ox O2 Delivery O2 Flow Rate FiO2


 


10/28/20 09:00      Room Air  


 


10/28/20 03:56 97.5 70 18 149/84 (105) 97   


 


10/27/20 20:03        21








Constitutional:  NAD


Neurologic:  CN 2-12 intact


Cardiovascular:  RRR


Respiratory:  CTA


Gastrointestinal:  S/NT/ND





Airway Exam


Mallampati Classification


2


Mallampati Score:  Class II


MO:  full


ROM:  full


Dentures:  no upper, no lower





Anesthesia Pre-op A/P


Labs





Hematology








Test


 10/28/20


06:15


 


White Blood Count


 4.8 K/UL


(4.8-10.8)


 


Red Blood Count


 4.21 M/UL


(4.20-5.40)


 


Hemoglobin


 10.9 G/DL


(12.0-16.0)  L


 


Hematocrit


 35.6 %


(37.0-47.0)  L


 


Mean Corpuscular Volume 85 FL (80-99)  


 


Mean Corpuscular Hemoglobin


 25.9 PG


(27.0-31.0)  L


 


Mean Corpuscular Hemoglobin


Concent 30.7 G/DL


(32.0-36.0)  L


 


Red Cell Distribution Width


 24.0 %


(11.6-14.8)  H


 


Platelet Count


 190 K/UL


(150-450)


 


Mean Platelet Volume


 7.2 FL


(6.5-10.1)


 


Neutrophils (%) (Auto)


 56.9 %


(45.0-75.0)


 


Lymphocytes (%) (Auto)


 31.8 %


(20.0-45.0)


 


Monocytes (%) (Auto)


 5.7 %


(1.0-10.0)


 


Eosinophils (%) (Auto)


 4.4 %


(0.0-3.0)  H


 


Basophils (%) (Auto)


 1.2 %


(0.0-2.0)








Chemistry








Test


 10/28/20


06:15


 


Sodium Level


 142 MMOL/L


(136-145)


 


Potassium Level


 3.9 MMOL/L


(3.5-5.1)


 


Chloride Level


 108 MMOL/L


()  H


 


Carbon Dioxide Level


 29 MMOL/L


(21-32)


 


Anion Gap


 5 mmol/L


(5-15)


 


Blood Urea Nitrogen


 5 mg/dL (7-18)


L


 


Creatinine


 0.7 MG/DL


(0.55-1.30)


 


Estimat Glomerular Filtration


Rate > 60 mL/min


(>60)


 


Glucose Level


 94 MG/DL


()


 


Calcium Level


 8.4 MG/DL


(8.5-10.1)  L


 


Total Bilirubin


 0.2 MG/DL


(0.2-1.0)


 


Aspartate Amino Transf


(AST/SGOT) 15 U/L (15-37)





 


Alanine Aminotransferase


(ALT/SGPT) 22 U/L (12-78)





 


Alkaline Phosphatase


 60 U/L


()


 


Total Protein


 5.6 G/DL


(6.4-8.2)  L


 


Albumin


 3.5 G/DL


(3.4-5.0)


 


Globulin 2.1 g/dL  


 


Albumin/Globulin Ratio 1.7 (1.0-2.7)  











Studies


Pre-op Studies:  EKG - SR





Risk Assessment & Plan


Assessment:


covid neg; denies cp


Plan:


mac


Status Change Before Surgery:  No





Pre-Antibiotics


Drug:  none











Jennifer Winston CRNA     Oct 28, 2020 11:42

## 2020-10-28 NOTE — GENERAL PROGRESS NOTE
Subjective


Date patient seen:  Oct 28, 2020


ROS Limited/Unobtainable:  No


Constitutional:  Denies: no symptoms, chills, diaphoresis, fever, malaise, 

weakness, other


HEENT:  Denies: no symptoms, eye pain, blurred vision, tearing, double vision, 

ear pain, ear discharge, nose pain, nose congestion, throat pain, throat 

swelling, mouth pain, mouth swelling, other


Cardiovascular:  Denies: no symptoms, chest pain, edema, irregular heart rate, 

lightheadedness, palpitations, syncope, other


Respiratory:  Denies: no symptoms, cough, orthopnea, shortness of breath, SOB 

with excertion, SOB at rest, sputum, stridor, wheezing, other


Gastrointestinal/Abdominal:  Reports: no symptoms, abdomen distended, abdominal 

pain, black stools, tarry stools, blood in stool, constipated, diarrhea, 

difficulty swallowing, nausea, poor appetite, poor fluid intake, rectal bleeding

, vomiting, other


Genitourinary:  Denies: no symptoms, burning, discharge, frequency, flank pain, 

hematuria, incontinence, pain, urgency, other


Neurologic/Psychiatric:  Denies: no symptoms, anxiety, depressed, emotional 

problems, headache, numbness, paresthesia, pre-existing deficit, seizure, 

tingling, tremors, weakness, other


Endocrine:  Denies: no symptoms, excessive sweating, flushing, intolerance to 

cold, intolerance to heat, increased hunger, increased thirst, increased urine, 

unexplained weight gain, unexplained weight loss, other


Hematologic/Lymphatic:  Denies: no symptoms, anemia, easy bleeding, easy 

bruising, other


Allergies:  


Coded Allergies:  


     MEPERIDINE (Verified  Allergy, Intermediate, Hives, 2/25/13)


   COPIED FROM UNCODED SECTION


     PANTOPRAZOLE (Verified  Allergy, Intermediate, 7/16/17)


     MORPHINE (Verified  Allergy, Unknown, ITCHING, 7/16/17)


     TRAMADOL (Verified  Allergy, Unknown, 2/17/19)


     METOCLOPRAMIDE (Verified  Adverse Reaction, Intermediate, "MY MUSCLE MUSCLE

ARE FREEZING UP", 2/25/13)


   COPIED FROM UNCODED SECTION


Subjective


still feeling mildly nauseous, was able to tolerate pureed diet yesterday. had 

small amount of hematemesis this AM.





Objective





Last 24 Hour Vital Signs








  Date Time  Temp Pulse Resp B/P (MAP) Pulse Ox O2 Delivery O2 Flow Rate FiO2


 


10/28/20 12:31  70 14  98   


 


10/28/20 12:00 96.3 72 18 146/83 (104) 100   


 


10/28/20 11:45  64 18 125/66 99 Room Air  


 


10/28/20 11:43  69 14  99   


 


10/28/20 11:40  65 16 124/75 98 Room Air  


 


10/28/20 11:38 97.4 68 16 107/70 100 Nasal Cannula 3 


 


10/28/20 09:00      Room Air  


 


10/28/20 08:00 97.1 71 18 145/84 (104) 99   


 


10/28/20 03:56 97.5 70 18 149/84 (105) 97   


 


10/28/20 00:00 98.0 73 16 118/63 (81) 98   


 


10/27/20 21:00      Room Air  


 


10/27/20 20:03  75 18  97 Room Air  21


 


10/27/20 20:00 97.7 75 20 111/70 (84) 96   


 


10/27/20 16:00 97.8 62 20 105/73 (84) 97   

















Intake and Output  


 


 10/27/20 10/28/20





 19:00 07:00


 


Intake Total 1100 ml 1100 ml


 


Balance 1100 ml 1100 ml


 


  


 


Intake Oral 600 ml 


 


IV Total 500 ml 1100 ml


 


# Voids 3 2








Laboratory Tests


10/27/20 17:55: 


Urine Opiates Screen Negative, Urine Barbiturates Screen Negative, Phencyclidine

(PCP) Screen Negative, Urine Amphetamines Screen Negative, Urine Benzodiazepines

Screen Negative, Urine Cocaine Screen Negative, Urine Marijuana (THC) Screen 

Negative


10/28/20 06:15: 


White Blood Count 4.8, Red Blood Count 4.21, Hemoglobin 10.9L, Hematocrit 35.6L,

Mean Corpuscular Volume 85, Mean Corpuscular Hemoglobin 25.9L, Mean Corpuscular 

Hemoglobin Concent 30.7L, Red Cell Distribution Width 24.0H, Platelet Count 190,

Mean Platelet Volume 7.2, Neutrophils (%) (Auto) 56.9, Lymphocytes (%) (Auto) 

31.8, Monocytes (%) (Auto) 5.7, Eosinophils (%) (Auto) 4.4H, Basophils (%) 

(Auto) 1.2, Sodium Level 142, Potassium Level 3.9, Chloride Level 108H, Carbon 

Dioxide Level 29, Anion Gap 5, Blood Urea Nitrogen 5L, Creatinine 0.7, Estimat 

Glomerular Filtration Rate > 60, Glucose Level 94, Calcium Level 8.4L, Total 

Bilirubin 0.2, Aspartate Amino Transf (AST/SGOT) 15, Alanine Aminotransferase 

(ALT/SGPT) 22, Alkaline Phosphatase 60, Total Protein 5.6L, Albumin 3.5, 

Globulin 2.1, Albumin/Globulin Ratio 1.7


Height (Feet):  5


Height (Inches):  9.00


Weight (Pounds):  119


General Appearance:  no apparent distress, alert


EENT:  PERRL/EOMI


Neck:  supple


Cardiovascular:  normal rate, regular rhythm


Respiratory/Chest:  lungs clear, normal breath sounds


Abdomen:  non tender, soft


Edema:  non-pitting


Neurologic:  alert, oriented x 3





Assessment/Plan


Problem List:  


(1) GIB (gastrointestinal bleeding)


ICD Codes:  K92.2 - GIB (gastrointestinal bleeding)


SNOMED:  98803180


(2) Gastrointestinal hemorrhage


ICD Codes:  K92.2 - Gastrointestinal hemorrhage, unspecified


SNOMED:  64673246


(3) Abdominal pain


ICD Codes:  R10.9 - Unspecified abdominal pain


SNOMED:  50451520


(4) GIB (gastrointestinal bleeding)


ICD Codes:  K92.2 - Gastrointestinal hemorrhage, unspecified


SNOMED:  19709209


Status:  stable


Assessment/Plan:


Mrs. Lucero is a 55F with PMH PUD, antrectomy, and anxiety who presents for 

hematemesis





A:





# ?Upper GIB 2/2 PUD, gastritis


# Mild Acute blood loss anemia vs ALEXSANDER


# Hx of Anxiety


# Hx of Peptic Ulcer Disease 2/2 H. Pylori


# Hx of Antrectomy


# Elevated BP


# Hypokalemia 





P





- Hgb stable at ~11, trend CBC  


- apparently allergic to Protonix even though shes on omeprazole at home, so 

will continue IV Pepcid


- continue home Carafate 


-s/p EGD with severe esophagitis seen. d/w GI, cleared for d/c. continue 

carafate. will monitor overnight. 


- transfuse if Hgb < 7


-Zofran not helping fully. added compazine in addition. 


-replete K, repeat BMP in AM. 


- consult Dr. Marmolejo, GI, recs appreciated





CODE: Full





GI: IV Pepcid


DVT: SCDs


Diet: soft diet 


Fluids: D5 





Dispo: d/c home tmrw. 





Spent 36 mins on patient encounter, 25 on counseling, coordination of care. d/w 

RN, consultants. 





Time of note may not reflect time patient was seen.











Abundio Jenkins MD          Oct 28, 2020 13:55

## 2020-10-28 NOTE — PRE-PROCEDURE NOTE/ATTESTATION
Pre-Procedure Note/Attestation


Complete Prior to Procedure


Planned Procedure:  not applicable


Procedure Narrative:


EGD





Indications for Procedure


Pre-Operative Diagnosis:


gib





Attestation


I attest that I discussed the nature of the procedure; its benefits; risks and 

complications; and alternatives (and the risks and benefits of such 

alternatives), prior to the procedure, with the patient (or the patient's legal 

representative).





I attest that, if there was a reasonable possibility of needing a blood 

transfusion, the patient (or the patient's legal representative) was given the 

[facility] standardized written summary, pursuant 

to the Jamal Tabby Blood Safety Act ([place] Health and Safety Code # 1645, as 

amended).





I attest that I re-evaluated the patient just prior to the surgery and that 

there has been no change in the patient's H&P, except as documented below:











Álvaro Marmolejo MD             Oct 28, 2020 09:01

## 2020-10-28 NOTE — PROCEDURE NOTE
DATE OF PROCEDURE:  10/28/2020

SURGEON:  Álvaro Marmolejo MD.



PROCEDURE:  Upper endoscopy with biopsy.



ANESTHESIA:  Per CRNA, Jennifer Winston.



INSTRUMENT:  Olympus adult flexible endoscope.



INDICATION:  Upper GI bleeding.



REASON FOR PROCEDURE:  The procedure, risks, benefits, and possible

consequences, including hemorrhage, aspiration, perforation and infection,

and alternative treatments, were explained to the patient/legal guardian

by Dr. Álvaro Marmolejo and the patient/legal guardian understood and

accepted these risks.



DESCRIPTION OF PROCEDURE:  After informed consent was obtained and the

patient was adequately sedated, Olympus upper endoscope was advanced from

mouth into the esophagus.  The patient had severe distal esophagitis,

erosive, most probably the source of bleeding.  GE junction was at about

36 centimeters from the incisors.  As soon as we passed the Z-line, we

actually entered the small intestine.  The patient had total gastrectomy.

Both afferent and efferent loop of the small bowel was intubated.  At this

time, we pulled the scope back into the esophagus.  Biopsy from the distal

esophagus was obtained and the procedure was terminated.



SUMMARY OF FINDINGS:

1. Severe erosive esophagitis.

2. Total gastrectomy with Latasha-en-Y surgery.



RECOMMENDATION:

1. Elevate the head of the bed at all times.

2. Start diet, GI soft.

3. Add Carafate.

4. Discontinue PPI given the patient has _____  gastric mucosa to make

any acid.

5. Discharge planning per primary team.









  ______________________________________________

  Álvaro Marmolejo M.D.





DR:  Nely

D:  10/28/2020 11:35

T:  10/28/2020 13:01

JOB#:  [id number]

CC:

## 2020-10-28 NOTE — 48 HOUR POST ANESTHESIA EVAL
Post Anesthesia Evaluation


Procedure:  EGD


Date of Evaluation:  Oct 28, 2020


Time of Evaluation:  12:31


Blood Pressure Systolic:  147


0:  70


Pulse Rate:  70


Respiratory Rate:  14


O2 Sat by Pulse Oximetry:  98


Airway:  patent


Nausea:  No


Vomiting:  No


Hydration Status:  adequate


Cardiopulmonary Status:


stable


Mental Status/LOC:  patient returned to baseline


Post-Anesthesia Complications:


none


Follow-up care needed:  N/A











Jennifer Winston CRNA     Oct 28, 2020 12:31

## 2020-10-29 VITALS — SYSTOLIC BLOOD PRESSURE: 133 MMHG | DIASTOLIC BLOOD PRESSURE: 70 MMHG

## 2020-10-29 VITALS — SYSTOLIC BLOOD PRESSURE: 104 MMHG | DIASTOLIC BLOOD PRESSURE: 64 MMHG

## 2020-10-29 LAB
ADD MANUAL DIFF: NO
BASOPHILS NFR BLD AUTO: 1.5 % (ref 0–2)
EOSINOPHIL NFR BLD AUTO: 6 % (ref 0–3)
ERYTHROCYTE [DISTWIDTH] IN BLOOD BY AUTOMATED COUNT: 25.6 % (ref 11.6–14.8)
HCT VFR BLD CALC: 33.8 % (ref 37–47)
HGB BLD-MCNC: 10.5 G/DL (ref 12–16)
LYMPHOCYTES NFR BLD AUTO: 33.7 % (ref 20–45)
MCV RBC AUTO: 86 FL (ref 80–99)
MONOCYTES NFR BLD AUTO: 7.7 % (ref 1–10)
NEUTROPHILS NFR BLD AUTO: 51.1 % (ref 45–75)
PLATELET # BLD: 154 K/UL (ref 150–450)
RBC # BLD AUTO: 3.94 M/UL (ref 4.2–5.4)
WBC # BLD AUTO: 3.7 K/UL (ref 4.8–10.8)

## 2020-10-29 RX ADMIN — SUCRALFATE SCH GM: 1 TABLET ORAL at 09:58

## 2020-10-29 RX ADMIN — SODIUM CHLORIDE PRN MG: 9 INJECTION, SOLUTION INTRAVENOUS at 06:19

## 2020-10-29 RX ADMIN — SODIUM CHLORIDE PRN MG: 9 INJECTION, SOLUTION INTRAVENOUS at 10:20

## 2020-10-29 RX ADMIN — SODIUM CHLORIDE PRN MG: 9 INJECTION, SOLUTION INTRAVENOUS at 01:53

## 2020-10-29 NOTE — DISCHARGE SUMMARY
Discharge Summary


Hospital Course


Date of Admission


Oct 26, 2020 at 15:50


Date of Discharge


Oct 29, 2020 at 11:30


Admitting Diagnosis


Hematemesis


HPI


Sue Lucero is a 55 year old female who was admitted on Oct 26, 2020 

at 15:50 for Hematemesis


Procedures


EGD


Hospital Course


Mrs. Lucero is a 55F with PMH PUD, antrectomy, and anxiety who presents for 

hematemesis and severe hypokalemia. Hypokalemia corrected after repletion and 

she underwent EGD (10/28/20) which showed severe esophagitis, which was deemed 

likely source of bleeding. no intervention done. she was instructed to take 

carafte and zofran prn for nausea. Patient is being d/c'ed home today in good 

condition.





Discharge


Condition Upon Discharge:  stable


Discharge Vital Signs





Last Vital Signs








  Date Time  Temp Pulse Resp B/P (MAP) Pulse Ox O2 Delivery O2 Flow Rate FiO2


 


10/29/20 09:04      Room Air  


 


10/29/20 08:03 97.7 65 20 104/64 (77) 99   


 


10/28/20 19:27        21


 


10/28/20 11:38       3 








Discharge Disposition


Patient was discharged to home


Discharge Diagnoses:  


(1) Gastrointestinal hemorrhage


(2) GIB (gastrointestinal bleeding)


(3) Abdominal pain


(4) Gastritis











Abundio Jenkins MD          Oct 29, 2020 11:50

## 2021-09-12 NOTE — NUR
NURSE NOTES: Patient requested to be discharged. Dr. Avilez verbally ordered for discharge. 
Discharge ordered and carried out. Patient' signed belonging list. Patient eagerly to leave 
and nurse is unable to complete discharge order documents. Patient signed on discharge 
instruction in the back of the form. Patient did not want to wait for Dr. Avilez to complete 
discharge documents. Dr. Avilez prescribed patient with written prescription. Prescription is 
copied and kept in the patient's chart. IV removed, ID discarded properly, cardiac monitor 
removed. Patient ambulates with steady gait, AO x4, no s/s of distress or SOB. Patient's 
discharge back to home with private vehicle. Nurse walked patient out the hospital unit at 
1515. Charge nurse and Dr Avilez aware that patient left the hospital. 12-Sep-2021 14:44